# Patient Record
Sex: FEMALE | Race: WHITE | Employment: UNEMPLOYED | URBAN - METROPOLITAN AREA
[De-identification: names, ages, dates, MRNs, and addresses within clinical notes are randomized per-mention and may not be internally consistent; named-entity substitution may affect disease eponyms.]

---

## 2019-02-13 ENCOUNTER — HOSPITAL ENCOUNTER (OUTPATIENT)
Age: 79
Discharge: HOME | End: 2019-02-15
Payer: COMMERCIAL

## 2019-02-13 PROCEDURE — 87075 CULTR BACTERIA EXCEPT BLOOD: CPT

## 2019-02-13 PROCEDURE — 87205 SMEAR GRAM STAIN: CPT

## 2019-02-13 PROCEDURE — 87070 CULTURE OTHR SPECIMN AEROBIC: CPT

## 2019-02-15 LAB
ANAEROBIC CULTURE: NORMAL
GRAM STAIN RESULT: NORMAL
WOUND/ABSCESS: NORMAL

## 2019-12-07 ENCOUNTER — HOSPITAL ENCOUNTER (EMERGENCY)
Age: 79
Discharge: HOME | End: 2019-12-07
Attending: EMERGENCY MEDICINE
Payer: COMMERCIAL

## 2019-12-07 ENCOUNTER — APPOINTMENT (OUTPATIENT)
Dept: GENERAL RADIOLOGY | Age: 79
End: 2019-12-07
Payer: COMMERCIAL

## 2019-12-07 VITALS
WEIGHT: 150 LBS | RESPIRATION RATE: 16 BRPM | SYSTOLIC BLOOD PRESSURE: 162 MMHG | BODY MASS INDEX: 23.54 KG/M2 | HEIGHT: 67 IN | OXYGEN SATURATION: 98 % | HEART RATE: 88 BPM | TEMPERATURE: 98.1 F | DIASTOLIC BLOOD PRESSURE: 78 MMHG

## 2019-12-07 DIAGNOSIS — R05.9 COUGH: Primary | ICD-10-CM

## 2019-12-07 LAB
ALBUMIN SERPL-MCNC: 4.1 G/DL (ref 3.5–5.2)
ALP BLD-CCNC: 101 U/L (ref 35–104)
ALT SERPL-CCNC: 12 U/L (ref 0–32)
ANION GAP SERPL CALCULATED.3IONS-SCNC: 9 MMOL/L (ref 7–16)
AST SERPL-CCNC: 21 U/L (ref 0–31)
BACTERIA: NORMAL /HPF
BASOPHILS ABSOLUTE: 0.05 E9/L (ref 0–0.2)
BASOPHILS RELATIVE PERCENT: 0.5 % (ref 0–2)
BILIRUB SERPL-MCNC: 0.2 MG/DL (ref 0–1.2)
BILIRUBIN URINE: NEGATIVE
BLOOD, URINE: NORMAL
BUN BLDV-MCNC: 12 MG/DL (ref 8–23)
CALCIUM SERPL-MCNC: 9.2 MG/DL (ref 8.6–10.2)
CHLORIDE BLD-SCNC: 100 MMOL/L (ref 98–107)
CLARITY: CLEAR
CO2: 26 MMOL/L (ref 22–29)
COLOR: YELLOW
CREAT SERPL-MCNC: 0.6 MG/DL (ref 0.5–1)
EKG ATRIAL RATE: 65 BPM
EKG P AXIS: 18 DEGREES
EKG P-R INTERVAL: 140 MS
EKG Q-T INTERVAL: 424 MS
EKG QRS DURATION: 98 MS
EKG QTC CALCULATION (BAZETT): 440 MS
EKG R AXIS: 28 DEGREES
EKG T AXIS: 45 DEGREES
EKG VENTRICULAR RATE: 65 BPM
EOSINOPHILS ABSOLUTE: 0.08 E9/L (ref 0.05–0.5)
EOSINOPHILS RELATIVE PERCENT: 0.9 % (ref 0–6)
EPITHELIAL CELLS, UA: NORMAL /HPF
GFR AFRICAN AMERICAN: >60
GFR NON-AFRICAN AMERICAN: >60 ML/MIN/1.73
GLUCOSE BLD-MCNC: 104 MG/DL (ref 74–99)
GLUCOSE URINE: NEGATIVE MG/DL
HCT VFR BLD CALC: 40.7 % (ref 34–48)
HEMOGLOBIN: 12.7 G/DL (ref 11.5–15.5)
IMMATURE GRANULOCYTES #: 0.02 E9/L
IMMATURE GRANULOCYTES %: 0.2 % (ref 0–5)
KETONES, URINE: NEGATIVE MG/DL
LEUKOCYTE ESTERASE, URINE: NEGATIVE
LYMPHOCYTES ABSOLUTE: 1.79 E9/L (ref 1.5–4)
LYMPHOCYTES RELATIVE PERCENT: 19.2 % (ref 20–42)
MCH RBC QN AUTO: 28.5 PG (ref 26–35)
MCHC RBC AUTO-ENTMCNC: 31.2 % (ref 32–34.5)
MCV RBC AUTO: 91.5 FL (ref 80–99.9)
MONOCYTES ABSOLUTE: 0.84 E9/L (ref 0.1–0.95)
MONOCYTES RELATIVE PERCENT: 9 % (ref 2–12)
NEUTROPHILS ABSOLUTE: 6.52 E9/L (ref 1.8–7.3)
NEUTROPHILS RELATIVE PERCENT: 70.2 % (ref 43–80)
NITRITE, URINE: NEGATIVE
PDW BLD-RTO: 12.7 FL (ref 11.5–15)
PH UA: 6 (ref 5–9)
PLATELET # BLD: 340 E9/L (ref 130–450)
PMV BLD AUTO: 9.5 FL (ref 7–12)
POTASSIUM REFLEX MAGNESIUM: 4.3 MMOL/L (ref 3.5–5)
PROTEIN UA: NEGATIVE MG/DL
RBC # BLD: 4.45 E12/L (ref 3.5–5.5)
RBC UA: NORMAL /HPF (ref 0–2)
SODIUM BLD-SCNC: 135 MMOL/L (ref 132–146)
SPECIFIC GRAVITY UA: <=1.005 (ref 1–1.03)
TOTAL PROTEIN: 7.1 G/DL (ref 6.4–8.3)
TROPONIN: <0.01 NG/ML (ref 0–0.03)
UROBILINOGEN, URINE: 0.2 E.U./DL
WBC # BLD: 9.3 E9/L (ref 4.5–11.5)
WBC UA: NORMAL /HPF (ref 0–5)

## 2019-12-07 PROCEDURE — 81001 URINALYSIS AUTO W/SCOPE: CPT

## 2019-12-07 PROCEDURE — 2580000003 HC RX 258: Performed by: PHYSICIAN ASSISTANT

## 2019-12-07 PROCEDURE — 71046 X-RAY EXAM CHEST 2 VIEWS: CPT

## 2019-12-07 PROCEDURE — 99285 EMERGENCY DEPT VISIT HI MDM: CPT

## 2019-12-07 PROCEDURE — 80053 COMPREHEN METABOLIC PANEL: CPT

## 2019-12-07 PROCEDURE — 36415 COLL VENOUS BLD VENIPUNCTURE: CPT

## 2019-12-07 PROCEDURE — 84484 ASSAY OF TROPONIN QUANT: CPT

## 2019-12-07 PROCEDURE — 85025 COMPLETE CBC W/AUTO DIFF WBC: CPT

## 2019-12-07 PROCEDURE — 93010 ELECTROCARDIOGRAM REPORT: CPT | Performed by: INTERNAL MEDICINE

## 2019-12-07 PROCEDURE — 93005 ELECTROCARDIOGRAM TRACING: CPT | Performed by: PHYSICIAN ASSISTANT

## 2019-12-07 RX ORDER — 0.9 % SODIUM CHLORIDE 0.9 %
1000 INTRAVENOUS SOLUTION INTRAVENOUS ONCE
Status: COMPLETED | OUTPATIENT
Start: 2019-12-07 | End: 2019-12-07

## 2019-12-07 RX ORDER — BENZONATATE 100 MG/1
100 CAPSULE ORAL 3 TIMES DAILY PRN
Qty: 21 CAPSULE | Refills: 0 | Status: SHIPPED | OUTPATIENT
Start: 2019-12-07 | End: 2019-12-14

## 2019-12-07 RX ADMIN — SODIUM CHLORIDE 1000 ML: 9 INJECTION, SOLUTION INTRAVENOUS at 12:31

## 2019-12-07 ASSESSMENT — ENCOUNTER SYMPTOMS
DIARRHEA: 0
VOMITING: 0
NAUSEA: 0
TROUBLE SWALLOWING: 0
ABDOMINAL PAIN: 0
EYE PAIN: 0
PHOTOPHOBIA: 0
SINUS PAIN: 0
COLOR CHANGE: 0
CHEST TIGHTNESS: 0
SHORTNESS OF BREATH: 0
SORE THROAT: 0
CONSTIPATION: 0
FACIAL SWELLING: 0
BACK PAIN: 0
SINUS PRESSURE: 0
COUGH: 1
EYE REDNESS: 0
WHEEZING: 0

## 2020-09-05 ENCOUNTER — HOSPITAL ENCOUNTER (INPATIENT)
Age: 80
LOS: 5 days | Discharge: HOME | DRG: 301 | End: 2020-09-10
Attending: EMERGENCY MEDICINE | Admitting: GENERAL PRACTICE
Payer: COMMERCIAL

## 2020-09-05 ENCOUNTER — APPOINTMENT (OUTPATIENT)
Dept: GENERAL RADIOLOGY | Age: 80
DRG: 301 | End: 2020-09-05
Payer: COMMERCIAL

## 2020-09-05 ENCOUNTER — APPOINTMENT (OUTPATIENT)
Dept: CT IMAGING | Age: 80
DRG: 301 | End: 2020-09-05
Payer: COMMERCIAL

## 2020-09-05 PROBLEM — S72.001A CLOSED DISPLACED FRACTURE OF RIGHT FEMORAL NECK (HCC): Status: ACTIVE | Noted: 2020-09-05

## 2020-09-05 LAB
ANION GAP SERPL CALCULATED.3IONS-SCNC: 8 MMOL/L (ref 7–16)
BASOPHILS ABSOLUTE: 0.05 E9/L (ref 0–0.2)
BASOPHILS RELATIVE PERCENT: 0.5 % (ref 0–2)
BUN BLDV-MCNC: 18 MG/DL (ref 8–23)
CALCIUM SERPL-MCNC: 9.4 MG/DL (ref 8.6–10.2)
CHLORIDE BLD-SCNC: 105 MMOL/L (ref 98–107)
CO2: 28 MMOL/L (ref 22–29)
CREAT SERPL-MCNC: 0.8 MG/DL (ref 0.5–1)
EOSINOPHILS ABSOLUTE: 0.15 E9/L (ref 0.05–0.5)
EOSINOPHILS RELATIVE PERCENT: 1.4 % (ref 0–6)
GFR AFRICAN AMERICAN: >60
GFR NON-AFRICAN AMERICAN: >60 ML/MIN/1.73
GLUCOSE BLD-MCNC: 123 MG/DL (ref 74–99)
HCT VFR BLD CALC: 38.9 % (ref 34–48)
HEMOGLOBIN: 12.2 G/DL (ref 11.5–15.5)
IMMATURE GRANULOCYTES #: 0.07 E9/L
IMMATURE GRANULOCYTES %: 0.7 % (ref 0–5)
LYMPHOCYTES ABSOLUTE: 3.01 E9/L (ref 1.5–4)
LYMPHOCYTES RELATIVE PERCENT: 28.1 % (ref 20–42)
MCH RBC QN AUTO: 29 PG (ref 26–35)
MCHC RBC AUTO-ENTMCNC: 31.4 % (ref 32–34.5)
MCV RBC AUTO: 92.4 FL (ref 80–99.9)
MONOCYTES ABSOLUTE: 0.79 E9/L (ref 0.1–0.95)
MONOCYTES RELATIVE PERCENT: 7.4 % (ref 2–12)
NEUTROPHILS ABSOLUTE: 6.63 E9/L (ref 1.8–7.3)
NEUTROPHILS RELATIVE PERCENT: 61.9 % (ref 43–80)
PDW BLD-RTO: 12.9 FL (ref 11.5–15)
PLATELET # BLD: 288 E9/L (ref 130–450)
PMV BLD AUTO: 9.5 FL (ref 7–12)
POTASSIUM REFLEX MAGNESIUM: 4.4 MMOL/L (ref 3.5–5)
RBC # BLD: 4.21 E12/L (ref 3.5–5.5)
SODIUM BLD-SCNC: 141 MMOL/L (ref 132–146)
TROPONIN: <0.01 NG/ML (ref 0–0.03)
WBC # BLD: 10.7 E9/L (ref 4.5–11.5)

## 2020-09-05 PROCEDURE — 99285 EMERGENCY DEPT VISIT HI MDM: CPT

## 2020-09-05 PROCEDURE — 84484 ASSAY OF TROPONIN QUANT: CPT

## 2020-09-05 PROCEDURE — 1200000000 HC SEMI PRIVATE

## 2020-09-05 PROCEDURE — 73610 X-RAY EXAM OF ANKLE: CPT

## 2020-09-05 PROCEDURE — 70450 CT HEAD/BRAIN W/O DYE: CPT

## 2020-09-05 PROCEDURE — 73552 X-RAY EXAM OF FEMUR 2/>: CPT

## 2020-09-05 PROCEDURE — 73590 X-RAY EXAM OF LOWER LEG: CPT

## 2020-09-05 PROCEDURE — 72125 CT NECK SPINE W/O DYE: CPT

## 2020-09-05 PROCEDURE — 93005 ELECTROCARDIOGRAM TRACING: CPT | Performed by: STUDENT IN AN ORGANIZED HEALTH CARE EDUCATION/TRAINING PROGRAM

## 2020-09-05 PROCEDURE — 80048 BASIC METABOLIC PNL TOTAL CA: CPT

## 2020-09-05 PROCEDURE — 85025 COMPLETE CBC W/AUTO DIFF WBC: CPT

## 2020-09-05 PROCEDURE — 71045 X-RAY EXAM CHEST 1 VIEW: CPT

## 2020-09-05 PROCEDURE — 73502 X-RAY EXAM HIP UNI 2-3 VIEWS: CPT

## 2020-09-05 ASSESSMENT — PAIN DESCRIPTION - ORIENTATION: ORIENTATION: RIGHT;LOWER;UPPER

## 2020-09-05 ASSESSMENT — PAIN DESCRIPTION - PAIN TYPE: TYPE: ACUTE PAIN

## 2020-09-05 ASSESSMENT — PAIN DESCRIPTION - DESCRIPTORS: DESCRIPTORS: ACHING;NUMBNESS

## 2020-09-05 ASSESSMENT — PAIN SCALES - GENERAL: PAINLEVEL_OUTOF10: 6

## 2020-09-05 ASSESSMENT — PAIN DESCRIPTION - FREQUENCY: FREQUENCY: CONTINUOUS

## 2020-09-05 ASSESSMENT — PAIN DESCRIPTION - LOCATION: LOCATION: LEG

## 2020-09-06 ENCOUNTER — ANESTHESIA (OUTPATIENT)
Dept: OPERATING ROOM | Age: 80
DRG: 301 | End: 2020-09-06
Payer: COMMERCIAL

## 2020-09-06 ENCOUNTER — APPOINTMENT (OUTPATIENT)
Dept: GENERAL RADIOLOGY | Age: 80
DRG: 301 | End: 2020-09-06
Payer: COMMERCIAL

## 2020-09-06 ENCOUNTER — ANESTHESIA EVENT (OUTPATIENT)
Dept: OPERATING ROOM | Age: 80
DRG: 301 | End: 2020-09-06
Payer: COMMERCIAL

## 2020-09-06 VITALS
SYSTOLIC BLOOD PRESSURE: 153 MMHG | RESPIRATION RATE: 17 BRPM | TEMPERATURE: 97.7 F | OXYGEN SATURATION: 96 % | DIASTOLIC BLOOD PRESSURE: 90 MMHG

## 2020-09-06 LAB
EKG ATRIAL RATE: 71 BPM
EKG P AXIS: 52 DEGREES
EKG P-R INTERVAL: 176 MS
EKG Q-T INTERVAL: 398 MS
EKG QRS DURATION: 90 MS
EKG QTC CALCULATION (BAZETT): 432 MS
EKG R AXIS: 35 DEGREES
EKG T AXIS: 50 DEGREES
EKG VENTRICULAR RATE: 71 BPM

## 2020-09-06 PROCEDURE — 2580000003 HC RX 258: Performed by: NURSE ANESTHETIST, CERTIFIED REGISTERED

## 2020-09-06 PROCEDURE — 6370000000 HC RX 637 (ALT 250 FOR IP): Performed by: ORTHOPAEDIC SURGERY

## 2020-09-06 PROCEDURE — 2709999900 HC NON-CHARGEABLE SUPPLY: Performed by: ORTHOPAEDIC SURGERY

## 2020-09-06 PROCEDURE — 6360000002 HC RX W HCPCS: Performed by: ORTHOPAEDIC SURGERY

## 2020-09-06 PROCEDURE — C1776 JOINT DEVICE (IMPLANTABLE): HCPCS | Performed by: ORTHOPAEDIC SURGERY

## 2020-09-06 PROCEDURE — 1200000000 HC SEMI PRIVATE

## 2020-09-06 PROCEDURE — 6360000002 HC RX W HCPCS: Performed by: ANESTHESIOLOGY

## 2020-09-06 PROCEDURE — 2500000003 HC RX 250 WO HCPCS: Performed by: NURSE ANESTHETIST, CERTIFIED REGISTERED

## 2020-09-06 PROCEDURE — 2700000000 HC OXYGEN THERAPY PER DAY

## 2020-09-06 PROCEDURE — 0SRR0JZ REPLACEMENT OF RIGHT HIP JOINT, FEMORAL SURFACE WITH SYNTHETIC SUBSTITUTE, OPEN APPROACH: ICD-10-PCS | Performed by: ORTHOPAEDIC SURGERY

## 2020-09-06 PROCEDURE — 7100000001 HC PACU RECOVERY - ADDTL 15 MIN: Performed by: ORTHOPAEDIC SURGERY

## 2020-09-06 PROCEDURE — 6370000000 HC RX 637 (ALT 250 FOR IP): Performed by: GENERAL PRACTICE

## 2020-09-06 PROCEDURE — 88311 DECALCIFY TISSUE: CPT

## 2020-09-06 PROCEDURE — 87081 CULTURE SCREEN ONLY: CPT

## 2020-09-06 PROCEDURE — 7100000000 HC PACU RECOVERY - FIRST 15 MIN: Performed by: ORTHOPAEDIC SURGERY

## 2020-09-06 PROCEDURE — 73501 X-RAY EXAM HIP UNI 1 VIEW: CPT

## 2020-09-06 PROCEDURE — 88305 TISSUE EXAM BY PATHOLOGIST: CPT

## 2020-09-06 PROCEDURE — 3700000001 HC ADD 15 MINUTES (ANESTHESIA): Performed by: ORTHOPAEDIC SURGERY

## 2020-09-06 PROCEDURE — 99254 IP/OBS CNSLTJ NEW/EST MOD 60: CPT | Performed by: ORTHOPAEDIC SURGERY

## 2020-09-06 PROCEDURE — 3600000014 HC SURGERY LEVEL 4 ADDTL 15MIN: Performed by: ORTHOPAEDIC SURGERY

## 2020-09-06 PROCEDURE — 93010 ELECTROCARDIOGRAM REPORT: CPT | Performed by: INTERNAL MEDICINE

## 2020-09-06 PROCEDURE — 6360000002 HC RX W HCPCS: Performed by: NURSE ANESTHETIST, CERTIFIED REGISTERED

## 2020-09-06 PROCEDURE — 3600000004 HC SURGERY LEVEL 4 BASE: Performed by: ORTHOPAEDIC SURGERY

## 2020-09-06 PROCEDURE — 3700000000 HC ANESTHESIA ATTENDED CARE: Performed by: ORTHOPAEDIC SURGERY

## 2020-09-06 PROCEDURE — 6370000000 HC RX 637 (ALT 250 FOR IP): Performed by: EMERGENCY MEDICINE

## 2020-09-06 PROCEDURE — 2580000003 HC RX 258: Performed by: ORTHOPAEDIC SURGERY

## 2020-09-06 PROCEDURE — 27236 TREAT THIGH FRACTURE: CPT | Performed by: ORTHOPAEDIC SURGERY

## 2020-09-06 PROCEDURE — U0003 INFECTIOUS AGENT DETECTION BY NUCLEIC ACID (DNA OR RNA); SEVERE ACUTE RESPIRATORY SYNDROME CORONAVIRUS 2 (SARS-COV-2) (CORONAVIRUS DISEASE [COVID-19]), AMPLIFIED PROBE TECHNIQUE, MAKING USE OF HIGH THROUGHPUT TECHNOLOGIES AS DESCRIBED BY CMS-2020-01-R: HCPCS

## 2020-09-06 DEVICE — HEAD FEM DIA26MM -3MM OFFSET HIP CO CHROM POLYETH TAPR LO: Type: IMPLANTABLE DEVICE | Site: HIP | Status: FUNCTIONAL

## 2020-09-06 DEVICE — STEM FEM SZ 4 L125MM NK L35MM +44MM OFFSET 127DEG HIP CO: Type: IMPLANTABLE DEVICE | Site: HIP | Status: FUNCTIONAL

## 2020-09-06 DEVICE — HEAD FEM OD45MM ID26MM HIP CO CHROM POLYETH BPLR CEMENTLESS: Type: IMPLANTABLE DEVICE | Site: HIP | Status: FUNCTIONAL

## 2020-09-06 RX ORDER — NEOSTIGMINE METHYLSULFATE 1 MG/ML
INJECTION, SOLUTION INTRAVENOUS PRN
Status: DISCONTINUED | OUTPATIENT
Start: 2020-09-06 | End: 2020-09-06 | Stop reason: SDUPTHER

## 2020-09-06 RX ORDER — SODIUM CHLORIDE 0.9 % (FLUSH) 0.9 %
10 SYRINGE (ML) INJECTION PRN
Status: DISCONTINUED | OUTPATIENT
Start: 2020-09-06 | End: 2020-09-10 | Stop reason: HOSPADM

## 2020-09-06 RX ORDER — MORPHINE SULFATE 4 MG/ML
4 INJECTION, SOLUTION INTRAMUSCULAR; INTRAVENOUS
Status: DISCONTINUED | OUTPATIENT
Start: 2020-09-06 | End: 2020-09-10 | Stop reason: HOSPADM

## 2020-09-06 RX ORDER — FENTANYL CITRATE 50 UG/ML
INJECTION, SOLUTION INTRAMUSCULAR; INTRAVENOUS PRN
Status: DISCONTINUED | OUTPATIENT
Start: 2020-09-06 | End: 2020-09-06 | Stop reason: SDUPTHER

## 2020-09-06 RX ORDER — PROPOFOL 10 MG/ML
INJECTION, EMULSION INTRAVENOUS PRN
Status: DISCONTINUED | OUTPATIENT
Start: 2020-09-06 | End: 2020-09-06 | Stop reason: SDUPTHER

## 2020-09-06 RX ORDER — GLYCOPYRROLATE 0.2 MG/ML
INJECTION INTRAMUSCULAR; INTRAVENOUS PRN
Status: DISCONTINUED | OUTPATIENT
Start: 2020-09-06 | End: 2020-09-06 | Stop reason: SDUPTHER

## 2020-09-06 RX ORDER — HYDROCODONE BITARTRATE AND ACETAMINOPHEN 5; 325 MG/1; MG/1
1 TABLET ORAL EVERY 6 HOURS PRN
Qty: 28 TABLET | Refills: 0 | Status: SHIPPED | OUTPATIENT
Start: 2020-09-06 | End: 2020-09-13

## 2020-09-06 RX ORDER — ONDANSETRON 2 MG/ML
INJECTION INTRAMUSCULAR; INTRAVENOUS PRN
Status: DISCONTINUED | OUTPATIENT
Start: 2020-09-06 | End: 2020-09-06 | Stop reason: SDUPTHER

## 2020-09-06 RX ORDER — ATORVASTATIN CALCIUM 20 MG/1
20 TABLET, FILM COATED ORAL DAILY
Status: DISCONTINUED | OUTPATIENT
Start: 2020-09-06 | End: 2020-09-10 | Stop reason: HOSPADM

## 2020-09-06 RX ORDER — HYDROCODONE BITARTRATE AND ACETAMINOPHEN 5; 325 MG/1; MG/1
1 TABLET ORAL ONCE
Status: COMPLETED | OUTPATIENT
Start: 2020-09-06 | End: 2020-09-06

## 2020-09-06 RX ORDER — PROMETHAZINE HYDROCHLORIDE 25 MG/1
12.5 TABLET ORAL EVERY 6 HOURS PRN
Status: DISCONTINUED | OUTPATIENT
Start: 2020-09-06 | End: 2020-09-10 | Stop reason: HOSPADM

## 2020-09-06 RX ORDER — OXYCODONE HYDROCHLORIDE AND ACETAMINOPHEN 5; 325 MG/1; MG/1
1 TABLET ORAL
Status: DISCONTINUED | OUTPATIENT
Start: 2020-09-06 | End: 2020-09-06 | Stop reason: HOSPADM

## 2020-09-06 RX ORDER — SODIUM CHLORIDE 9 MG/ML
INJECTION, SOLUTION INTRAVENOUS CONTINUOUS
Status: ACTIVE | OUTPATIENT
Start: 2020-09-06 | End: 2020-09-07

## 2020-09-06 RX ORDER — SENNA AND DOCUSATE SODIUM 50; 8.6 MG/1; MG/1
1 TABLET, FILM COATED ORAL 2 TIMES DAILY
Status: DISCONTINUED | OUTPATIENT
Start: 2020-09-06 | End: 2020-09-10 | Stop reason: HOSPADM

## 2020-09-06 RX ORDER — SODIUM CHLORIDE 0.9 % (FLUSH) 0.9 %
10 SYRINGE (ML) INJECTION EVERY 12 HOURS SCHEDULED
Status: DISCONTINUED | OUTPATIENT
Start: 2020-09-06 | End: 2020-09-10 | Stop reason: HOSPADM

## 2020-09-06 RX ORDER — PROMETHAZINE HYDROCHLORIDE 25 MG/ML
6.25 INJECTION, SOLUTION INTRAMUSCULAR; INTRAVENOUS PRN
Status: DISCONTINUED | OUTPATIENT
Start: 2020-09-06 | End: 2020-09-06 | Stop reason: HOSPADM

## 2020-09-06 RX ORDER — ASPIRIN 81 MG/1
81 TABLET ORAL 2 TIMES DAILY
Qty: 56 TABLET | Refills: 0 | Status: SHIPPED | OUTPATIENT
Start: 2020-09-06 | End: 2020-10-04

## 2020-09-06 RX ORDER — ONDANSETRON 2 MG/ML
4 INJECTION INTRAMUSCULAR; INTRAVENOUS EVERY 6 HOURS PRN
Status: DISCONTINUED | OUTPATIENT
Start: 2020-09-06 | End: 2020-09-10 | Stop reason: HOSPADM

## 2020-09-06 RX ORDER — MEPERIDINE HYDROCHLORIDE 25 MG/ML
12.5 INJECTION INTRAMUSCULAR; INTRAVENOUS; SUBCUTANEOUS
Status: DISCONTINUED | OUTPATIENT
Start: 2020-09-06 | End: 2020-09-06 | Stop reason: HOSPADM

## 2020-09-06 RX ORDER — ACETAMINOPHEN 325 MG/1
650 TABLET ORAL EVERY 4 HOURS PRN
Status: DISCONTINUED | OUTPATIENT
Start: 2020-09-06 | End: 2020-09-10 | Stop reason: HOSPADM

## 2020-09-06 RX ORDER — ROCURONIUM BROMIDE 10 MG/ML
INJECTION, SOLUTION INTRAVENOUS PRN
Status: DISCONTINUED | OUTPATIENT
Start: 2020-09-06 | End: 2020-09-06 | Stop reason: SDUPTHER

## 2020-09-06 RX ORDER — OXYCODONE HYDROCHLORIDE 5 MG/1
10 TABLET ORAL EVERY 4 HOURS PRN
Status: DISCONTINUED | OUTPATIENT
Start: 2020-09-06 | End: 2020-09-10 | Stop reason: HOSPADM

## 2020-09-06 RX ORDER — GLYCOPYRROLATE 1 MG/5 ML
SYRINGE (ML) INTRAVENOUS PRN
Status: DISCONTINUED | OUTPATIENT
Start: 2020-09-06 | End: 2020-09-06 | Stop reason: SDUPTHER

## 2020-09-06 RX ORDER — HYDROCODONE BITARTRATE AND ACETAMINOPHEN 5; 325 MG/1; MG/1
1 TABLET ORAL EVERY 4 HOURS PRN
Status: DISCONTINUED | OUTPATIENT
Start: 2020-09-06 | End: 2020-09-10 | Stop reason: HOSPADM

## 2020-09-06 RX ORDER — FENTANYL CITRATE 50 UG/ML
50 INJECTION, SOLUTION INTRAMUSCULAR; INTRAVENOUS EVERY 5 MIN PRN
Status: DISCONTINUED | OUTPATIENT
Start: 2020-09-06 | End: 2020-09-06 | Stop reason: HOSPADM

## 2020-09-06 RX ORDER — LIDOCAINE HYDROCHLORIDE 20 MG/ML
INJECTION, SOLUTION EPIDURAL; INFILTRATION; INTRACAUDAL; PERINEURAL PRN
Status: DISCONTINUED | OUTPATIENT
Start: 2020-09-06 | End: 2020-09-06 | Stop reason: SDUPTHER

## 2020-09-06 RX ORDER — SODIUM CHLORIDE 9 MG/ML
INJECTION, SOLUTION INTRAVENOUS CONTINUOUS PRN
Status: DISCONTINUED | OUTPATIENT
Start: 2020-09-06 | End: 2020-09-06 | Stop reason: SDUPTHER

## 2020-09-06 RX ORDER — OXYCODONE HYDROCHLORIDE 5 MG/1
5 TABLET ORAL EVERY 4 HOURS PRN
Status: DISCONTINUED | OUTPATIENT
Start: 2020-09-06 | End: 2020-09-10 | Stop reason: HOSPADM

## 2020-09-06 RX ORDER — ASPIRIN 81 MG/1
81 TABLET ORAL 2 TIMES DAILY
Status: DISCONTINUED | OUTPATIENT
Start: 2020-09-06 | End: 2020-09-10 | Stop reason: HOSPADM

## 2020-09-06 RX ORDER — MORPHINE SULFATE 2 MG/ML
2 INJECTION, SOLUTION INTRAMUSCULAR; INTRAVENOUS
Status: DISCONTINUED | OUTPATIENT
Start: 2020-09-06 | End: 2020-09-10 | Stop reason: HOSPADM

## 2020-09-06 RX ADMIN — HYDROMORPHONE HYDROCHLORIDE 0.25 MG: 1 INJECTION, SOLUTION INTRAMUSCULAR; INTRAVENOUS; SUBCUTANEOUS at 19:59

## 2020-09-06 RX ADMIN — DOCUSATE SODIUM 50 MG AND SENNOSIDES 8.6 MG 1 TABLET: 8.6; 5 TABLET, FILM COATED ORAL at 21:34

## 2020-09-06 RX ADMIN — GLYCOPYRROLATE 0.6 MG: 0.2 INJECTION INTRAMUSCULAR; INTRAVENOUS at 19:26

## 2020-09-06 RX ADMIN — SODIUM CHLORIDE: 9 INJECTION, SOLUTION INTRAVENOUS at 19:17

## 2020-09-06 RX ADMIN — HYDROCODONE BITARTRATE AND ACETAMINOPHEN 1 TABLET: 5; 325 TABLET ORAL at 16:37

## 2020-09-06 RX ADMIN — SODIUM CHLORIDE, PRESERVATIVE FREE 10 ML: 5 INJECTION INTRAVENOUS at 21:31

## 2020-09-06 RX ADMIN — MORPHINE SULFATE 2 MG: 2 INJECTION, SOLUTION INTRAMUSCULAR; INTRAVENOUS at 21:34

## 2020-09-06 RX ADMIN — PROPOFOL 70 MG: 10 INJECTION, EMULSION INTRAVENOUS at 18:05

## 2020-09-06 RX ADMIN — ONDANSETRON 4 MG: 2 INJECTION INTRAMUSCULAR; INTRAVENOUS at 18:24

## 2020-09-06 RX ADMIN — FENTANYL CITRATE 50 MCG: 50 INJECTION, SOLUTION INTRAMUSCULAR; INTRAVENOUS at 18:22

## 2020-09-06 RX ADMIN — ROCURONIUM BROMIDE 40 MG: 10 INJECTION, SOLUTION INTRAVENOUS at 18:05

## 2020-09-06 RX ADMIN — HYDROCODONE BITARTRATE AND ACETAMINOPHEN 1 TABLET: 5; 325 TABLET ORAL at 00:27

## 2020-09-06 RX ADMIN — FENTANYL CITRATE 50 MCG: 50 INJECTION, SOLUTION INTRAMUSCULAR; INTRAVENOUS at 18:05

## 2020-09-06 RX ADMIN — LIDOCAINE HYDROCHLORIDE 40 MG: 20 INJECTION, SOLUTION EPIDURAL; INFILTRATION; INTRACAUDAL; PERINEURAL at 18:05

## 2020-09-06 RX ADMIN — Medication 2 G: at 18:01

## 2020-09-06 RX ADMIN — ROCURONIUM BROMIDE 10 MG: 10 INJECTION, SOLUTION INTRAVENOUS at 18:44

## 2020-09-06 RX ADMIN — Medication 0.6 MG: at 19:27

## 2020-09-06 RX ADMIN — SODIUM CHLORIDE: 9 INJECTION, SOLUTION INTRAVENOUS at 18:02

## 2020-09-06 RX ADMIN — HYDROMORPHONE HYDROCHLORIDE 0.25 MG: 1 INJECTION, SOLUTION INTRAMUSCULAR; INTRAVENOUS; SUBCUTANEOUS at 20:03

## 2020-09-06 RX ADMIN — ASPIRIN 81 MG: 81 TABLET, COATED ORAL at 21:34

## 2020-09-06 RX ADMIN — Medication 3 MG: at 19:26

## 2020-09-06 ASSESSMENT — PULMONARY FUNCTION TESTS
PIF_VALUE: 18
PIF_VALUE: 25
PIF_VALUE: 18
PIF_VALUE: 24
PIF_VALUE: 22
PIF_VALUE: 17
PIF_VALUE: 18
PIF_VALUE: 17
PIF_VALUE: 15
PIF_VALUE: 17
PIF_VALUE: 18
PIF_VALUE: 17
PIF_VALUE: 19
PIF_VALUE: 16
PIF_VALUE: 18
PIF_VALUE: 17
PIF_VALUE: 0
PIF_VALUE: 18
PIF_VALUE: 17
PIF_VALUE: 18
PIF_VALUE: 18
PIF_VALUE: 17
PIF_VALUE: 19
PIF_VALUE: 17
PIF_VALUE: 17
PIF_VALUE: 19
PIF_VALUE: 19
PIF_VALUE: 17
PIF_VALUE: 18
PIF_VALUE: 16
PIF_VALUE: 17
PIF_VALUE: 8
PIF_VALUE: 18
PIF_VALUE: 18
PIF_VALUE: 19
PIF_VALUE: 18
PIF_VALUE: 18
PIF_VALUE: 2
PIF_VALUE: 18
PIF_VALUE: 5
PIF_VALUE: 18
PIF_VALUE: 17
PIF_VALUE: 17
PIF_VALUE: 18
PIF_VALUE: 19
PIF_VALUE: 3
PIF_VALUE: 17
PIF_VALUE: 15
PIF_VALUE: 17
PIF_VALUE: 18
PIF_VALUE: 18
PIF_VALUE: 16
PIF_VALUE: 20
PIF_VALUE: 17
PIF_VALUE: 18
PIF_VALUE: 7
PIF_VALUE: 18
PIF_VALUE: 14
PIF_VALUE: 14
PIF_VALUE: 18
PIF_VALUE: 18
PIF_VALUE: 0
PIF_VALUE: 14
PIF_VALUE: 17
PIF_VALUE: 17
PIF_VALUE: 18
PIF_VALUE: 17
PIF_VALUE: 18
PIF_VALUE: 5
PIF_VALUE: 5
PIF_VALUE: 18
PIF_VALUE: 18
PIF_VALUE: 10
PIF_VALUE: 17
PIF_VALUE: 19
PIF_VALUE: 21
PIF_VALUE: 17
PIF_VALUE: 18
PIF_VALUE: 3
PIF_VALUE: 18
PIF_VALUE: 17
PIF_VALUE: 17
PIF_VALUE: 0
PIF_VALUE: 16
PIF_VALUE: 17
PIF_VALUE: 1
PIF_VALUE: 15
PIF_VALUE: 18
PIF_VALUE: 17
PIF_VALUE: 14
PIF_VALUE: 17
PIF_VALUE: 20
PIF_VALUE: 17
PIF_VALUE: 19
PIF_VALUE: 4
PIF_VALUE: 17
PIF_VALUE: 18
PIF_VALUE: 19

## 2020-09-06 ASSESSMENT — PAIN DESCRIPTION - PAIN TYPE
TYPE: SURGICAL PAIN
TYPE: SURGICAL PAIN
TYPE: ACUTE PAIN
TYPE: SURGICAL PAIN
TYPE: ACUTE PAIN

## 2020-09-06 ASSESSMENT — ENCOUNTER SYMPTOMS
DIARRHEA: 0
SINUS PRESSURE: 0
ABDOMINAL DISTENTION: 0
SORE THROAT: 0
BACK PAIN: 0
NAUSEA: 0
EYE DISCHARGE: 0
WHEEZING: 0
EYE REDNESS: 0
EYE PAIN: 0
COUGH: 0
SHORTNESS OF BREATH: 0
VOMITING: 0

## 2020-09-06 ASSESSMENT — PAIN - FUNCTIONAL ASSESSMENT
PAIN_FUNCTIONAL_ASSESSMENT: PREVENTS OR INTERFERES WITH MANY ACTIVE NOT PASSIVE ACTIVITIES
PAIN_FUNCTIONAL_ASSESSMENT: PREVENTS OR INTERFERES SOME ACTIVE ACTIVITIES AND ADLS
PAIN_FUNCTIONAL_ASSESSMENT: PREVENTS OR INTERFERES SOME ACTIVE ACTIVITIES AND ADLS

## 2020-09-06 ASSESSMENT — PAIN SCALES - GENERAL
PAINLEVEL_OUTOF10: 9
PAINLEVEL_OUTOF10: 7
PAINLEVEL_OUTOF10: 6
PAINLEVEL_OUTOF10: 7
PAINLEVEL_OUTOF10: 6
PAINLEVEL_OUTOF10: 9
PAINLEVEL_OUTOF10: 4
PAINLEVEL_OUTOF10: 0
PAINLEVEL_OUTOF10: 6
PAINLEVEL_OUTOF10: 9
PAINLEVEL_OUTOF10: 5
PAINLEVEL_OUTOF10: 5

## 2020-09-06 ASSESSMENT — PAIN DESCRIPTION - FREQUENCY
FREQUENCY: CONTINUOUS

## 2020-09-06 ASSESSMENT — PAIN DESCRIPTION - ORIENTATION
ORIENTATION: RIGHT

## 2020-09-06 ASSESSMENT — PAIN DESCRIPTION - ONSET
ONSET: ON-GOING
ONSET: GRADUAL
ONSET: GRADUAL
ONSET: ON-GOING

## 2020-09-06 ASSESSMENT — PAIN DESCRIPTION - LOCATION
LOCATION: HIP
LOCATION: HIP
LOCATION: ANKLE;HIP
LOCATION: HIP
LOCATION: HIP;ANKLE
LOCATION: HIP
LOCATION: HIP

## 2020-09-06 ASSESSMENT — PAIN DESCRIPTION - DESCRIPTORS
DESCRIPTORS: ACHING;DISCOMFORT;CONSTANT
DESCRIPTORS: DISCOMFORT
DESCRIPTORS: DISCOMFORT
DESCRIPTORS: ACHING;DISCOMFORT;CONSTANT;SORE
DESCRIPTORS: DISCOMFORT

## 2020-09-06 ASSESSMENT — PAIN DESCRIPTION - PROGRESSION
CLINICAL_PROGRESSION: NOT CHANGED
CLINICAL_PROGRESSION: GRADUALLY WORSENING
CLINICAL_PROGRESSION: GRADUALLY IMPROVING
CLINICAL_PROGRESSION: NOT CHANGED
CLINICAL_PROGRESSION: GRADUALLY IMPROVING

## 2020-09-06 ASSESSMENT — LIFESTYLE VARIABLES: SMOKING_STATUS: 0

## 2020-09-06 NOTE — CONSULTS
Chief Complaint   Patient presents with    Numbness     rt leg    Fall    Leg Pain     rt       SUBJECTIVE: Patient is a very pleasant 81-year-old female who came into the emergency department yesterday with a chief complaint of right hip pain. She states she had some numbness in her right leg then all of a sudden fell and then was able to ambulate on the right side. She was found to have a significantly displaced right femoral neck fracture. She does not currently live alone but she states she is having difficulties with her independence recently. She denies any chronic right hip pain. She denies any other injuries. Review of Systems   Constitutional: Negative for fever, chills, diaphoresis, appetite change and fatigue. HENT: Negative for dental issues, hearing loss and tinnitus. Negative for congestion, sinus pressure, sneezing, sore throat. Negative for headache. Eyes: Negative for visual disturbance, blurred and double vision. Negative for pain, discharge, redness and itching  Respiratory: Negative for cough, shortness of breath and wheezing. Cardiovascular: Negative for chest pain, palpitations and leg swelling. No dyspnea on exertion   Gastrointestinal:   Negative for nausea, vomiting, abdominal pain, diarrhea, constipation  or black or bloody. Hematologic\Lymphatic:  negative for bleeding, petechiae,   Genitourinary: Negative for hematuria and difficulty urinating. Musculoskeletal: Negative for neck pain and stiffness. Negative for back pain, see HPI  Skin: Negative for pallor, rash and wound. Neurological: Negative for dizziness, tremors, seizures, weakness, light-headedness, no TIA or stroke symptoms. No numbness and headaches. Psychiatric/Behavioral: Negative.         Past Medical History:   Diagnosis Date    Hyperlipidemia     SCC (squamous cell carcinoma) 3/7/2011     Past Surgical History:   Procedure Laterality Date    BREAST SURGERY      pt unknown what breast lump removed femoral neck fracture, displaced         Discussion:  I explained the surgery in detail. I explained the risks and complications of surgery with the patient including but not limited to death from anesthesia, possible neurovascular damage, possible infection,  Possible wound healing issues, possible perioperative fracture, leg length discrepancy, implant failure, possible need for further surgery, etc.  Patient understood this, asked appropriate questions and decided to go forward with the procedure. PLAN:    Plan for right hip hemiarthroplasty for femoral neck fracture    Medical management appreciate their input    Will most likely need some rehab after fixation.         ELECTRONICALLY signed by:    Pravin Luz MD  9/6/20

## 2020-09-06 NOTE — ED NOTES
Bed: 07  Expected date:   Expected time:   Means of arrival:   Comments:  Hip pain     Jamin Wang RN  09/05/20 2288

## 2020-09-06 NOTE — ANESTHESIA PRE PROCEDURE
Department of Anesthesiology  Preprocedure Note       Name:  Ash Eaton   Age:  70 y.o.  :  1940                                          MRN:  [id number]         Date:  2020      Surgeon: Juliane Colon):  Elizabeth Randhawa MD    Procedure: Procedure(s):  HIP HEMIARTHROPLASTY    Medications prior to admission:   Prior to Admission medications    Medication Sig Start Date End Date Taking? Authorizing Provider   simvastatin (ZOCOR) 40 MG tablet Take 40 mg by mouth nightly   Yes Historical Provider, MD       Current medications:    Current Facility-Administered Medications   Medication Dose Route Frequency Provider Last Rate Last Dose    HYDROcodone-acetaminophen (NORCO) 5-325 MG per tablet 1 tablet  1 tablet Oral Q4H PRN Metro Clau, DO        atorvastatin (LIPITOR) tablet 20 mg  20 mg Oral Daily Metro Clau DO           Allergies:  No Known Allergies    Problem List:    Patient Active Problem List   Diagnosis Code    SCC (squamous cell carcinoma) C44.92    Melanoma ([facility] [address] C43.9    Closed displaced fracture of right femoral neck ([facility] [address] S72.001A       Past Medical History:        Diagnosis Date    Hyperlipidemia     SCC (squamous cell carcinoma) 3/7/2011       Past Surgical History:        Procedure Laterality Date    BREAST SURGERY      pt unknown what breast lump removed    HYSTERECTOMY      pt unsure of med hx    SKIN BIOPSY         Social History:    Social History     Tobacco Use    Smoking status: Never Smoker    Smokeless tobacco: Never Used   Substance Use Topics    Alcohol use:  No                                Counseling given: Not Answered      Vital Signs (Current):   Vitals:    20 2144 20 2326 20 0045 20 0845   BP: (!) 160/72 (!) 159/60 (!) 147/71 (!) 121/56   Pulse: 70 72 83 77   Resp: 16 18 18 16   Temp: 97.6 °F (36.4 °C) 97.5 °F (36.4 °C) 98.3 °F (36.8 °C) 98.8 °F (37.1 °C)   TempSrc: Oral Oral Oral Oral   SpO2: 95% 96% 98% 98% BP Readings from Last 3 Encounters:   09/06/20 (!) 121/56   12/07/19 (!) 162/78   03/04/16 120/56       NPO Status: Time of last liquid consumption: 2100                        Time of last solid consumption: 2100                        Date of last liquid consumption: 09/05/20                        Date of last solid food consumption: 09/05/20    BMI:   Wt Readings from Last 3 Encounters:   12/07/19 150 lb (68 kg)   03/04/16 166 lb (75.3 kg)   02/11/16 160 lb (72.6 kg)     There is no height or weight on file to calculate BMI.    CBC:   Lab Results   Component Value Date    WBC 10.7 09/05/2020    RBC 4.21 09/05/2020    HGB 12.2 09/05/2020    HCT 38.9 09/05/2020    MCV 92.4 09/05/2020    RDW 12.9 09/05/2020     09/05/2020       CMP:   Lab Results   Component Value Date     09/05/2020    K 4.4 09/05/2020     09/05/2020    CO2 28 09/05/2020    BUN 18 09/05/2020    CREATININE 0.8 09/05/2020    GFRAA >60 09/05/2020    LABGLOM >60 09/05/2020    GLUCOSE 123 09/05/2020    PROT 7.1 12/07/2019    CALCIUM 9.4 09/05/2020    BILITOT 0.2 12/07/2019    ALKPHOS 101 12/07/2019    AST 21 12/07/2019    ALT 12 12/07/2019       POC Tests: No results for input(s): POCGLU, POCNA, POCK, POCCL, POCBUN, POCHEMO, POCHCT in the last 72 hours.     Coags: No results found for: PROTIME, INR, APTT    HCG (If Applicable): No results found for: PREGTESTUR, PREGSERUM, HCG, HCGQUANT     ABGs: No results found for: PHART, PO2ART, HPK7IWD, GPU8IRP, BEART, D9KEZDIR     Type & Screen (If Applicable):  No results found for: LABABO, LABRH    Drug/Infectious Status (If Applicable):  No results found for: HIV, HEPCAB    COVID-19 Screening (If Applicable): No results found for: COVID19      Anesthesia Evaluation  Patient summary reviewed and Nursing notes reviewed no history of anesthetic complications:   Airway: Mallampati: II  TM distance: >3 FB   Neck ROM: full  Mouth opening: > = 3 FB Dental: normal exam Pulmonary:Negative Pulmonary ROS breath sounds clear to auscultation      (-) not a current smoker                           Cardiovascular:  Exercise tolerance: good (>4 METS),   (+) hyperlipidemia      ECG reviewed  Rhythm: regular  Rate: normal           Beta Blocker:  Not on Beta Blocker         Neuro/Psych:                ROS comment: Scoliosis   GI/Hepatic/Renal: Neg GI/Hepatic/Renal ROS            Endo/Other:    (+) malignancy/cancer. ROS comment: R hip fx Abdominal:           Vascular: negative vascular ROS. Anesthesia Plan      general and spinal     ASA 2     (Prefers GA)  Induction: intravenous. Anesthetic plan and risks discussed with patient.                     Hodan Light MD   9/6/2020

## 2020-09-06 NOTE — DISCHARGE INSTR - COC
Continuity of Care Form    Patient Name: Sanford Zacarias   :  1940  MRN:  [id number]    Admit date:  2020  Discharge date:  9/10/20    Code Status Order: Prior   Advance Directives:   [facility] Documentation       Date/Time Healthcare Directive Type of Healthcare Directive Copy in [facility] [address] Agent's Name Healthcare Agent's Phone Number    20 0044  No, patient does not have an advance directive for healthcare treatment -- -- -- -- --            Admitting Physician:  Sharon Olson DO  PCP: Sharon Olson DO    Discharging Nurse: [address] Unit/Room#: 2538/2168-S  Discharging Unit Phone Number: [phone]    Emergency Contact:   Extended Emergency Contact Information  Primary Emergency Contact: Faith Florez [address] [place] [address] Phone: [phone]  Relation: Other    Past Surgical History:  Past Surgical History:   Procedure Laterality Date    BREAST SURGERY      pt unknown what breast lump removed    HYSTERECTOMY      pt unsure of med hx    SKIN BIOPSY         Immunization History: There is no immunization history on file for this patient.     Active Problems:  Patient Active Problem List   Diagnosis Code    SCC (squamous cell carcinoma) C44.92    Melanoma ([facility] [address] C43.9    Closed displaced fracture of right femoral neck ([facility] [address] S72.001A       Isolation/Infection:   Isolation            No Isolation          Patient Infection Status       None to display            Nurse Assessment:  Last Vital Signs: BP (!) 147/71   Pulse 83   Temp 98.3 °F (36.8 °C) (Oral)   Resp 18   SpO2 98%     Last documented pain score (0-10 scale): Pain Level: 5  Last Weight:   Wt Readings from Last 1 Encounters:   19 150 lb (68 kg)     Mental Status:  oriented, coherent and logical    IV Access:  - None    Nursing Mobility/ADLs:  Walking   Assisted  Transfer  Assisted  Bathing  Assisted  Dressing  Assisted  Toileting Discharge: Stable    Rehab Potential (if transferring to Rehab): {Prognosis:[id number]}    Recommended Labs or Other Treatments After Discharge: ***    Physician Certification: I certify the above information and transfer of Layla Leroy  is necessary for the continuing treatment of the diagnosis listed and that she requires Andrew Sebastian for less 30 days.      Update Admission H&P: {CHP DME Changes in MKAPY:[id number]}    PHYSICIAN SIGNATURE:  Electronically signed by Leonora Humphries DO on 9/10/20 at 11:16 AM EDT

## 2020-09-06 NOTE — ED PROVIDER NOTES
General: She is not in acute distress. Appearance: Normal appearance. HENT:      Head: Normocephalic and atraumatic. Nose: No congestion or rhinorrhea. Mouth/Throat:      Mouth: Mucous membranes are moist.      Pharynx: Oropharynx is clear. Eyes:      Extraocular Movements: Extraocular movements intact. Pupils: Pupils are equal, round, and reactive to light. Neck:      Musculoskeletal: Normal range of motion. No neck rigidity or muscular tenderness. Cardiovascular:      Rate and Rhythm: Normal rate and regular rhythm. Heart sounds: No murmur. No gallop. Pulmonary:      Effort: Pulmonary effort is normal. No respiratory distress. Breath sounds: No wheezing, rhonchi or rales. Abdominal:      General: Abdomen is flat. Palpations: Abdomen is soft. There is no mass. Tenderness: There is no abdominal tenderness. There is no guarding. Hernia: No hernia is present. Musculoskeletal:         General: No swelling or signs of injury. Right hip: She exhibits decreased range of motion, decreased strength, tenderness and bony tenderness. She exhibits no deformity. Comments: Positive pain to right hip with leg roll. Right lower extremity neurovascular intact dorsalis pedis and posterior tibialis pulses in place. Patient able to dorsiflex and plantarflex. Sensation intact grossly to light touch. Skin:     General: Skin is warm and dry. Capillary Refill: Capillary refill takes less than 2 seconds. Neurological:      General: No focal deficit present. Mental Status: She is alert and oriented to person, place, and time. Mental status is at baseline. Motor: No weakness.    Psychiatric:         Mood and Affect: Mood normal.          Procedures       MDM  Number of Diagnoses or Management Options  Closed fracture of neck of right femur, initial encounter [facility]):   Diagnosis management comments: Patient 70-year-old female presenting after suffering fall from home. On presentation vital signs stable. On physical exam patient had no focal logical deficits. No C-spine tenderness. Patient does note tenderness to right hip she does have positive logroll. Right lower extremity neurovascular intact dorsalis pedis and posterior tibialis pulses palpable. Patient able to dorsiflex and plantarflex against resistance. CT scan of the head and neck was obtained which was unremarkable. X-rays of the right hip and pelvis along with right femur and right ankle were obtained. Pelvis and right hip x-rays demonstrated displaced femoral neck fracture. Call was placed to orthopedic surgery for consultation. Orthopedics recommended medicine admission and will see patient in consultation. Patient given Percocet for pain. Spoke with medicine who agreed to meet patient. Plan of care discussed with patient patient is agreeable for admission. Patient was admitted in stable condition. ED Course as of Sep 06 0033   Sat Sep 05, 2020   2330 Spoke with ortho Dr. Pablito Parnell recommended medical admission he will see patient in consultation.    [BP]   [phone] with Dr. Shobha Hayes, he will admit patient. [BP]      ED Course User Index  [BP] Jose C Reid, DO        --------------------------------------------- PAST HISTORY ---------------------------------------------  Past Medical History:  has a past medical history of Hyperlipidemia and SCC (squamous cell carcinoma). Past Surgical History:  has a past surgical history that includes Breast surgery; Hysterectomy (2005); and skin biopsy. Social History:  reports that she has never smoked. She has never used smokeless tobacco. She reports that she does not drink alcohol or use drugs. Family History: family history includes Cancer in her father. The patients home medications have been reviewed. Allergies: Patient has no known allergies.     -------------------------------------------------- RESULTS XR ANKLE RIGHT (MIN 3 VIEWS)   Final Result      NO SIGNIFICANT BONY ABNORMALITY IN THE RIGHT ANKLE      Severe right lateral malleolus soft tissue swelling. XR FEMUR RIGHT (MIN 2 VIEWS)   Final Result       Right femoral neck fracture with the distal fracture fragment   displaced cephalad. XR TIBIA FIBULA RIGHT (2 VIEWS)   Final Result      NORMAL RIGHT TIBIA AND FIBULA. NO EVIDENCE OF FRACTURE OR DISLOCATION      Severe right lateral malleolus soft tissue swelling. CT Head WO Contrast   Final Result      No evidence for acute intracranial process. Cortical atrophy and chronic periventricular microangiopathy. Mild rotary subluxation of C1-2 is probably chronic. A fracture is not   detected. CT Cervical Spine WO Contrast   Final Result      No acute fracture or spondylolisthesis is identified on CT of cervical   spine. Diffuse degenerative disc and facet disease result in no severe   central or foraminal stenosis. Thyroid nodules are present. EKG:  This EKG is signed and interpreted by me. Rate: 71  Rhythm: Sinus  Interpretation: no acute changes  Comparison: stable as compared to patient's most recent EKG      ------------------------- NURSING NOTES AND VITALS REVIEWED ---------------------------  Date / Time Roomed:  9/5/2020  9:26 PM  ED Bed Assignment:  07/07    The nursing notes within the ED encounter and vital signs as below have been reviewed.      Patient Vitals for the past 24 hrs:   BP Temp Temp src Pulse Resp SpO2   09/05/20 2326 (!) 159/60 97.5 °F (36.4 °C) Oral 72 18 96 %   09/05/20 2144 (!) 160/72 97.6 °F (36.4 °C) Oral 70 16 95 %       Oxygen Saturation Interpretation: Normal    ------------------------------------------ PROGRESS NOTES ------------------------------------------  Re-evaluation(s):  Time: 7300  Patients symptoms show no change  Repeat physical examination is not changed    Counseling:  I have spoken with the patient and discussed todays results, in addition to providing specific details for the plan of care and counseling regarding the diagnosis and prognosis. Their questions are answered at this time and they are agreeable with the plan of admission.    --------------------------------- ADDITIONAL PROVIDER NOTES ---------------------------------  Consultations:  Time: 2345. Spoke with Dr. Cinthia Morelos. Discussed case. They will admit the patient. This patient's ED course included: a personal history and physicial examination    This patient has remained hemodynamically stable during their ED course. Diagnosis:  1. Closed fracture of neck of right femur, initial encounter ([facility] [address]        Disposition:  Patient's disposition: Admit to med/surg floor  Patient's condition is good. Patient was seen and evaluated by myself and my attending Mireya Kelly DO. Assessment and Plan discussed with attending provider, please see attestation for final plan of care.      DO Daily Donohue DO  Resident  09/06/20 6353

## 2020-09-06 NOTE — PROGRESS NOTES
[facility] Quality Flow/Interdisciplinary Rounds Progress Note        Quality Flow Rounds held on September 6, 2020    Disciplines Attending:  Bedside Nurse, ,  and Nursing Unit Leadership    Wally Azul was admitted on 9/5/2020  9:26 PM    Anticipated Discharge Date:  Expected Discharge Date: 09/08/20    Disposition:    Landon Score:  Landon Scale Score: 17    Readmission Risk              Risk of Unplanned Readmission:        8           Discussed patient goal for the day, patient clinical progression, and barriers to discharge.   The following Goal(s) of the Day/Commitment(s) have been identified:  Pain Control      An Langley  September 6, 2020

## 2020-09-06 NOTE — ED NOTES
Pt tp room via ambulance. Per pt she was sitting in a chair. Her leg became numb, she tried to stand, had no strength in leg and fell.      Carin Schmidt RN  09/05/20 5923

## 2020-09-06 NOTE — H&P
[address] [address]                              HISTORY AND PHYSICAL    PATIENT NAME: Dom Brock                     :        1940  MED REC NO:   [id number]                            ROOM:       [id number]  ACCOUNT NO:   [de-identified]                           ADMIT DATE: 2020  PROVIDER:     Vinayak Epstein DO    HISTORY OF PRESENT ILLNESS:  This a 65-year-old female, presented to the  emergency department after having a fall. She had immediate pain in her  right leg and right hip. She was unable to stand or unable to bear full  weight. She complains that the pain is severe and constant. She had an  x-ray obtained in the emergency department, which showed a femoral neck  fracture of the right hip, and she was subsequently admitted for  orthopedic consultation. RECENT CURRENT MEDICATIONS:  Included Zocor 40 q.d. PAST MEDICAL HISTORY:  Significant for history of melanoma, squamous  cell carcinoma of the skin, hyperlipidemia. She has had surgeries that  included hysterectomy, breast surgery, skin biopsies, and removal of  skin tumors. SOCIAL HISTORY:  She is a lifelong nonsmoker and nondrinker. Denies use  of narcotic drugs or alcoholic beverages. FAMILY MEDICAL HISTORY:  Noted and discussed. REVIEW OF SYSTEMS:  Negative for vertigo, cephalgia, ringing in the  ears, hearing loss, difficulty swallowing, hoarseness in her voice. She  has no chest pain, palpitations, orthopnea, paroxysmal nocturnal  dyspnea, or edema. There is no cough, wheeze, shortness of breath,  hemoptysis, or pleuritic pain. There is no nausea, vomiting, diarrhea,  or constipation. No blood in the stool. No recent change in weight. There is no urgency, frequency, dysuria, hematuria. The endocrine  system is negative for diabetes or thyroid disorder. The  musculoskeletal system is positive for recent hip fracture. Her  integumentary system is positive for melanoma and squamous cell  carcinoma in the past.  Psychiatric system negative for anxiety or  depression. Neurologic system negative for CVA, seizures, tremors,  tics, or TIAs. PHYSICAL EXAMINATION:  GENERAL:  Shows this to be a 73-year-old white female in moderate  distress with the above complaints. HEAD, EYES, EARS, NOSE, AND THROAT:  Examination shows the head to be  normocephalic and atraumatic. Pupils are equal and reactive to light  and accommodation. Extraocular eye muscles are intact. Tympanic  membranes are clear. Ear canals are patent. Oral mucosa pink and  moist.  NECK:  Neck veins are flat, nondistended. No carotid bruits. CHEST:  Symmetrical.  HEART:  Had a regular rate and rhythm without murmur, gallops, or  friction rub. LUNGS:  Clear to auscultation bilaterally without rhonchi, rales, or  wheezes. ABDOMEN:  Soft, nontender, nondistended. Bowel sounds are present in  all four quadrants. EXTERNAL GENITALIA:  Intact. EXTREMITIES:  Peripheral pulses are okay. Legs are without edema. There is pain noted over the right greater trochanter. There is an  externally rotated hip. There is extreme pain with movement of any  sorts of the hip. She is unable to bear weight. IMPRESSION:  Initial impression at this time is femoral neck fracture. The patient will be admitted. Appropriate pain control will be given  and orthopedic consult will be asked for. Anticipate surgical  correction. At the time of admission, her condition is fair. Her  prognosis is guarded.         Kimberly Hua DO    D: 09/06/2020 9:34:10       T: 09/06/2020 9:36:44     [email or web address]  Job#: [id number]     Doc#: [id number]    CC:

## 2020-09-06 NOTE — PROGRESS NOTES
Nurse to nurse report give to 5W RN. All orthopedic questions answered. Discussed ortho post-op care as well. Patient is able to ambulate with front wheeled walker once stable post-operatively. PCD, teds, and SMI post-operatively. Nice to be removed POD#1 after patient sees therapy.
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
## 2020-09-07 ENCOUNTER — APPOINTMENT (OUTPATIENT)
Dept: GENERAL RADIOLOGY | Age: 80
DRG: 301 | End: 2020-09-07
Payer: COMMERCIAL

## 2020-09-07 LAB
ANION GAP SERPL CALCULATED.3IONS-SCNC: 8 MMOL/L (ref 7–16)
BUN BLDV-MCNC: 10 MG/DL (ref 8–23)
CALCIUM SERPL-MCNC: 8.7 MG/DL (ref 8.6–10.2)
CHLORIDE BLD-SCNC: 99 MMOL/L (ref 98–107)
CO2: 25 MMOL/L (ref 22–29)
CREAT SERPL-MCNC: 0.7 MG/DL (ref 0.5–1)
GFR AFRICAN AMERICAN: >60
GFR NON-AFRICAN AMERICAN: >60 ML/MIN/1.73
GLUCOSE BLD-MCNC: 149 MG/DL (ref 74–99)
HCT VFR BLD CALC: 34.6 % (ref 34–48)
HEMOGLOBIN: 11.2 G/DL (ref 11.5–15.5)
MCH RBC QN AUTO: 29.4 PG (ref 26–35)
MCHC RBC AUTO-ENTMCNC: 32.4 % (ref 32–34.5)
MCV RBC AUTO: 90.8 FL (ref 80–99.9)
PDW BLD-RTO: 12.7 FL (ref 11.5–15)
PLATELET # BLD: 238 E9/L (ref 130–450)
PMV BLD AUTO: 9.6 FL (ref 7–12)
POTASSIUM REFLEX MAGNESIUM: 4.1 MMOL/L (ref 3.5–5)
RBC # BLD: 3.81 E12/L (ref 3.5–5.5)
SODIUM BLD-SCNC: 132 MMOL/L (ref 132–146)
WBC # BLD: 13.5 E9/L (ref 4.5–11.5)

## 2020-09-07 PROCEDURE — 97161 PT EVAL LOW COMPLEX 20 MIN: CPT

## 2020-09-07 PROCEDURE — 97110 THERAPEUTIC EXERCISES: CPT

## 2020-09-07 PROCEDURE — 2580000003 HC RX 258: Performed by: ORTHOPAEDIC SURGERY

## 2020-09-07 PROCEDURE — 6370000000 HC RX 637 (ALT 250 FOR IP): Performed by: ORTHOPAEDIC SURGERY

## 2020-09-07 PROCEDURE — 36415 COLL VENOUS BLD VENIPUNCTURE: CPT

## 2020-09-07 PROCEDURE — 72170 X-RAY EXAM OF PELVIS: CPT

## 2020-09-07 PROCEDURE — 97116 GAIT TRAINING THERAPY: CPT

## 2020-09-07 PROCEDURE — 1200000000 HC SEMI PRIVATE

## 2020-09-07 PROCEDURE — 85027 COMPLETE CBC AUTOMATED: CPT

## 2020-09-07 PROCEDURE — 6360000002 HC RX W HCPCS: Performed by: ORTHOPAEDIC SURGERY

## 2020-09-07 PROCEDURE — 97530 THERAPEUTIC ACTIVITIES: CPT

## 2020-09-07 PROCEDURE — 80048 BASIC METABOLIC PNL TOTAL CA: CPT

## 2020-09-07 PROCEDURE — 97165 OT EVAL LOW COMPLEX 30 MIN: CPT

## 2020-09-07 PROCEDURE — 6370000000 HC RX 637 (ALT 250 FOR IP): Performed by: GENERAL PRACTICE

## 2020-09-07 RX ADMIN — Medication 2 G: at 09:15

## 2020-09-07 RX ADMIN — DOCUSATE SODIUM 50 MG AND SENNOSIDES 8.6 MG 1 TABLET: 8.6; 5 TABLET, FILM COATED ORAL at 20:11

## 2020-09-07 RX ADMIN — OXYCODONE HYDROCHLORIDE 10 MG: 5 TABLET ORAL at 01:10

## 2020-09-07 RX ADMIN — SODIUM CHLORIDE, PRESERVATIVE FREE 10 ML: 5 INJECTION INTRAVENOUS at 09:16

## 2020-09-07 RX ADMIN — ATORVASTATIN CALCIUM 20 MG: 20 TABLET, FILM COATED ORAL at 09:16

## 2020-09-07 RX ADMIN — Medication 2 G: at 00:31

## 2020-09-07 RX ADMIN — DOCUSATE SODIUM 50 MG AND SENNOSIDES 8.6 MG 1 TABLET: 8.6; 5 TABLET, FILM COATED ORAL at 09:16

## 2020-09-07 RX ADMIN — ASPIRIN 81 MG: 81 TABLET, COATED ORAL at 09:16

## 2020-09-07 RX ADMIN — ASPIRIN 81 MG: 81 TABLET, COATED ORAL at 20:11

## 2020-09-07 RX ADMIN — SODIUM CHLORIDE: 9 INJECTION, SOLUTION INTRAVENOUS at 04:44

## 2020-09-07 RX ADMIN — SODIUM CHLORIDE, PRESERVATIVE FREE 10 ML: 5 INJECTION INTRAVENOUS at 20:12

## 2020-09-07 ASSESSMENT — PAIN SCALES - GENERAL
PAINLEVEL_OUTOF10: 9
PAINLEVEL_OUTOF10: 0
PAINLEVEL_OUTOF10: 5
PAINLEVEL_OUTOF10: 0

## 2020-09-07 ASSESSMENT — PAIN DESCRIPTION - DESCRIPTORS: DESCRIPTORS: CONSTANT;DISCOMFORT

## 2020-09-07 ASSESSMENT — PAIN DESCRIPTION - PROGRESSION: CLINICAL_PROGRESSION: GRADUALLY WORSENING

## 2020-09-07 ASSESSMENT — PAIN DESCRIPTION - PAIN TYPE: TYPE: SURGICAL PAIN

## 2020-09-07 ASSESSMENT — PAIN DESCRIPTION - FREQUENCY: FREQUENCY: CONTINUOUS

## 2020-09-07 ASSESSMENT — PAIN DESCRIPTION - ORIENTATION: ORIENTATION: RIGHT

## 2020-09-07 ASSESSMENT — PAIN DESCRIPTION - ONSET: ONSET: ON-GOING

## 2020-09-07 ASSESSMENT — PAIN - FUNCTIONAL ASSESSMENT: PAIN_FUNCTIONAL_ASSESSMENT: PREVENTS OR INTERFERES SOME ACTIVE ACTIVITIES AND ADLS

## 2020-09-07 ASSESSMENT — PAIN DESCRIPTION - LOCATION: LOCATION: HIP

## 2020-09-07 NOTE — PROGRESS NOTES
1945 admitted to [facility] right hip dressing with aquacel surgical ag intact   1953  X-ray of right hip done post op in [facility]

## 2020-09-07 NOTE — PROGRESS NOTES
Physical Therapy  Initial Eval    Attending Provider:  Brigitte Lizama DO    Evaluating PT:  Dinora Goode PT    Room #:  9291/9166-M  Diagnosis:  Closed fracture of neck of right femur  Pertinent PMHx/PSHx:  S/P RT Hip Manuel   Procedure/Surgery:  RT Hip Manuel 9/6/20  Precautions:  WBAT, [place] hip prec, falls,   Equipment Needs:  [employer],     SUBJECTIVE:    Pt lives with alone in a 1 story Apt with 0 stairs and 0 rail to enter; elevator to floor. Pt ambulated with No AD PTA. No prior falls reported. OBJECTIVE:   Initial Evaluation  Date: 9/7/2020 Treatment Short Term/ Long Term   Goals   Was pt agreeable to Eval/treatment? Yes     Does pt have pain? Min RT hip     Bed Mobility  Rolling: CGA  Supine to sit: Min  Sit to supine: NA/hip chair transfer  Scooting: SBA/S  SBA/CGA all levels    Transfers Sit to stand: Min/CGA  Stand to sit: SBA/S  Stand pivot: NA  SBA/S all surfaces with good safety awareness   Ambulation   10 feet with [employer] Min A. See below  40+ feet with [employer] CGA/SBA   Stair negotiation: ascended and descended  NA     AM-PAC 6 Clicks 71/03       Pt is A & O x 3  RTLE ROM: hip flex AA/A 80*, knee/ankle WNL  MMT: RT hip 3-/5, knee 4/5, ankle 4/5   Sensation:  Pt denies numbness and tingling to RTLE  Edema:  RT hip  Balance: sitting is Indep and standing with [employer] is CGA  Endurance: Functional, non dyspnea @ 10' walk     Therapeutic Exercises:  AA/AROM RT hip flex x 20, LAQ x 20, AP,x 20    Patient education  Pt educated on WBAT, [place] hip, transfer hand place, bed mobility,     Patient response to education: Good understanding universal hip prec. Review daily. Pt verbalized understanding Pt demonstrated skill Pt requires further education in this area   Yes Yes Yes     ASSESSMENT:    Comments:  Highly cooperative and pleasant. Alert and oriented x 3. Appears to fully understand all universal hip prec; review daily to ensure uptake.   Initially difficulty with loading RTLE; would not place weight on LE standing. Improving loading tolerances RTLE after 3-5' walking but continued to guard/limit loading. Narrow AME needing assist to guide WW in room. Safe transfer into hip chair with waffle cushion and chair alarm placed by aid prior to seated chair. Chair alarm ON position. Treatment:  Patient practiced and was instructed in the following treatment:     Eval   There-ex   Gait 10'   Education  Pt was left phone and with call light left by patient. Chair/bed alarm: On chair    Pt's/ family goals   1. \"To get better\"    Patient and or family understand(s) diagnosis, prognosis, and plan of care. PLAN:    PT care will be provided in accordance with the objectives noted above. Exercises and functional mobility practice will be used as well as appropriate assistive devices or modalities to obtain goals. Patient and family education will also be administered as needed. Frequency of treatments: 5x/week x 1-2 weeks. BID     Evaluation Time includes thorough review of current medical information, gathering information on past medical history/social history and prior level of function, completion of standardized testing/informal observation of tasks, assessment of data and education on plan of care and goals.     CPT codes:  [x] Low Complexity PT evaluation 34501  [] Moderate Complexity PT evaluation 08963  [] High Complexity PT evaluation 69523  [] PT Re-evaluation 22697  [x] Gait training 18919 5 minutes  [] Manual therapy 93857 ** minutes  [x] Therapeutic activities 89088 10 minutes  [] Therapeutic exercises 38879 ** minutes  [] Neuromuscular reeducation 17139 ** minutes    Dhiraj Ta PT

## 2020-09-07 NOTE — PLAN OF CARE
Problem: Skin Integrity:  Goal: Will show no infection signs and symptoms  Description: Will show no infection signs and symptoms  9/6/2020 2247 by Annie Beal RN  Outcome: Completed  9/6/2020 0940 by Dean Patel RN  Outcome: Met This Shift  Goal: Absence of new skin breakdown  Description: Absence of new skin breakdown  Outcome: Completed  Goal: Demonstration of wound healing without infection will improve  Description: Demonstration of wound healing without infection will improve  Outcome: Completed  Goal: Risk for impaired skin integrity will decrease  Description: Risk for impaired skin integrity will decrease  Outcome: Completed     Problem: Pain:  Goal: Pain level will decrease  Description: Pain level will decrease  Outcome: Completed  Goal: Control of acute pain  Description: Control of acute pain  Outcome: Completed  Goal: Control of chronic pain  Description: Control of chronic pain  Outcome: Completed     Problem: Sensory:  Goal: Pain level will decrease  Description: Pain level will decrease  Outcome: Completed     Problem: Falls - Risk of:  Goal: Will remain free from falls  Description: Will remain free from falls  9/6/2020 0940 by Dean Patel RN  Outcome: Met This Shift

## 2020-09-07 NOTE — PROGRESS NOTES
Occupational Therapy  OCCUPATIONAL THERAPY INITIAL EVALUATION      Date:2020  Patient Name: Ash Eaton  MRN: [id number]  : 1940  Room: [address] [place], [place] [address]    Referring Provider:  Caleen Cogan, DO    Evaluating OT: Bakari Rivera OTR/L [id number]    AM-PAC Daily Activity Raw Score: 15/24    Recommended Adaptive Equipment: TBD     Diagnosis: femur fracture. Fall at home. S/p   HIP HEMIARTHROPLASTY (Right Hip) 2020  Pertinent Medical History: squaumous cell carcinoma    Precautions:  Falls, WBAT R LE, alarm,standard hip precautions      Home Living: Pt lives alone, 1 floor aprtment with no steps.    Tub/shower unit with grab bars  Prior Level of Function: Independent  with ADLs , Independent  with IADLs; ambulated no device     Pain Level: R LE discomfort/stiffness ;   Cognition:  Oriented,alert and conversing     Judgement/safety:  Fair -              Memory forgetful      Functional Assessment:   Initial Eval Status  Date: 20 Tx Session  STGs = LTGs  1-2 weeks    Feeding Set-up      Grooming SBA/set-up ,seated   Decrease standing tolerance, balance and safety   Mod I    UB Dressing Min A  Don/doff hospital gown   Mod I    LB Dressing Max A   Mod I    Bathing Max A   mod I    Toileting Assist with hygiene      Bed Mobility  Up in chair upon arrival      Functional Transfers Min A  Sit-stand from elevated chair   Mod I    Functional Mobility Mod A,w/walker   Steps next to chair   Cueing for tech, walker safety   Unsteadiness   Mod I with good tolerance    Balance Sitting:     Static:  Supervision     Standing: Mod/Min A      Activity Tolerance Fair with light activity   Good  with ADL activity   Visual/  Perceptual Glasses: none by bedside                 Right  hand dominant    Strength ROM Additional Info:    RUE  4-/5  WFL good  and wfl FMC/dexterity noted during ADL tasks       LUE 4-/5  WFL good  and wfl FMC/dexterity noted during ADL tasks         [facility]   Sensation:  No c/o numbness or tingling     Comments:      Upon arrival, patient sitting up in chair. At end of session, patient returned to chair with call light and phone within reach, all lines and tubes intact. . Overall patient demonstrated  decreased independence and safety during completion of ADL/functional transfer/mobility tasks. .  Based on patient's functional performance as documented above  and prior level of function, patient would benefit from continued skilled OT during/following hospital stay in an effort to increase functional safety, independence with ADLS/IADLS, and overall quality of life. ALARM ON       Assessment of current deficits   Functional mobility [x]  ADLs [x] Strength [x]  Cognition []  Functional transfers  [x] IADLs [x] Safety Awareness [x]  Endurance [x]  Fine Motor Coordination [] Balance [x] Vision/perception [] Sensation []   Gross Motor Coordination [] ROM [] Delirium []                  Motor Control []    Plan of Care: OT 1-3x/week PRN  ADL retraining [x]   Equipment needs [x]   Neuromuscular re-education [x] Energy Conservation Techniques [x]  Functional Transfer training [x] Patient and/or Family Education [x]  Functional Mobility training [x]  Environmental Modifications [x]  Cognitive re-training []   Compensatory techniques for ADLs [x]  Splinting Needs []   Positioning to improve overall function [x]   Therapeutic Activity [x]  Therapeutic Exercise  [x]  Visual/Perceptual: []    Delirium prevention/treatment  []   Other:  []    Rehab Potential: Good for established goals     Patient / Family Goal: rehab       Patient and/or family were instructed on functional diagnosis, prognosis/goals and OT plan of care. Demonstrated good  understanding.     Low  Evaluation   [id number]      Evaluation time includes thorough review of current medical information, gathering information on past medical history/social history and prior level of function, completion of standardized testing/informal observation of tasks, assessment of data, and development of POC/Goals      Suzie Helton OTR/L [id number]

## 2020-09-07 NOTE — PLAN OF CARE
Problem: Falls - Risk of:  Goal: Will remain free from falls  Description: Will remain free from falls  Outcome: Met This Shift     Problem:  Activity:  Goal: Ability to ambulate will improve  Description: Ability to ambulate will improve  9/7/2020 1102 by Esther Bernardo RN  Outcome: Met This Shift

## 2020-09-07 NOTE — PLAN OF CARE
Problem:  Activity:  Goal: Ability to ambulate will improve  Description: Ability to ambulate will improve  Outcome: Ongoing  Goal: Ability to perform activities at highest level will improve  Description: Ability to perform activities at highest level will improve  Outcome: Ongoing     Problem: Health Behavior:  Goal: Identification of resources available to assist in meeting health care needs will improve  Description: Identification of resources available to assist in meeting health care needs will improve  Outcome: Ongoing     Problem: Nutritional:  Goal: Ability to attain and maintain optimal nutritional status will improve  Description: Ability to attain and maintain optimal nutritional status will improve  Outcome: Ongoing     Problem: Physical Regulation:  Goal: Will remain free from infection  Description: Will remain free from infection  Outcome: Ongoing  Goal: Postoperative complications will be avoided or minimized  Description: Postoperative complications will be avoided or minimized  Outcome: Ongoing  Goal: Diagnostic test results will improve  Description: Diagnostic test results will improve  Outcome: Ongoing     Problem: Respiratory:  Goal: Ability to maintain adequate ventilation will improve  Description: Ability to maintain adequate ventilation will improve  Outcome: Ongoing     Problem: Safety:  Goal: Ability to remain free from injury will improve  Description: Ability to remain free from injury will improve  Outcome: Ongoing     Problem: Self-Care:  Goal: Ability to meet self-care needs will improve  Description: Ability to meet self-care needs will improve  Outcome: Ongoing     Problem: Self-Concept:  Goal: Ability to maintain and perform role responsibilities to the fullest extent possible will improve  Description: Ability to maintain and perform role responsibilities to the fullest extent possible will improve  Outcome: Ongoing  Goal: Verbalizations of decreased anxiety will increase  Description: Verbalizations of decreased anxiety will increase  Outcome: Ongoing     Problem: Tissue Perfusion:  Goal: Peripheral tissue perfusion will improve  Description: Peripheral tissue perfusion will improve  Outcome: Ongoing  Goal: Risk of venous thrombosis will decrease  Description: Risk of venous thrombosis will decrease  Outcome: Ongoing     Problem: Urinary Elimination:  Goal: Ability to reestablish a normal urinary elimination pattern will improve - after catheter removal  Description: Ability to reestablish a normal urinary elimination pattern will improve  Outcome: Ongoing

## 2020-09-07 NOTE — ANESTHESIA POSTPROCEDURE EVALUATION
Department of Anesthesiology  Postprocedure Note    Patient: Wally Azul  MRN: [id number]  YOB: 1940  Date of evaluation: 9/7/2020  Time:  3:43 AM     Procedure Summary     Date:  09/06/20 Room / Location:  [facility] OR 05 / [facility]    Anesthesia Start:  0403 Anesthesia Stop:  1948    Procedure:  HIP HEMIARTHROPLASTY (Right Hip) Diagnosis:  (fracture)    Surgeon:  Brie Blackwood MD Responsible Provider:  Indu Crum MD    Anesthesia Type:  general, spinal ASA Status:  2          Anesthesia Type: general, spinal    Shan Phase I: Shan Score: 9    Shan Phase II:      Last vitals: Reviewed and per EMR flowsheets.        Anesthesia Post Evaluation    Patient location during evaluation: PACU  Patient participation: complete - patient participated  Level of consciousness: awake and alert  Airway patency: patent  Nausea & Vomiting: no vomiting and no nausea  Complications: no  Cardiovascular status: blood pressure returned to baseline  Respiratory status: acceptable  Hydration status: euvolemic

## 2020-09-07 NOTE — PROGRESS NOTES
Physical Therapy    S: In bed on arrival receptive for PM session. Pain under control and no increase from AM session. Wants to get back into chair after walk and stay in chair. Review of universal hip precautions provided. O: Bed mob: Min assisted   Sit bal: Indep  Transfers: bed to stand min assist x 1/CGA  ROM: RT hip AA flex 85*, knee/ankle WFL  MMT: RT hip 3-/5, knee/ankle 4-5/5  Amb: [employer] CGA 40' (self-limited fatigue/pain)  There-x: RTLE  Education: review hip prec    A: Highly motivated and cooperative. Improving ROM, MMT and general mobility tolerances. Improved loading RTLE during transfers and gait PM session with lessened antalgias. Step to non reciprocal gait pattern without drifting, lateral veering or LOB. General fatigue walking requiring brief < 10 sec static standing breaks during gait. No dyspnea or overt signs pulmonary distress. Reviewed universal hip precautions and appears to understand but cannot give me details on them. Tolerated all there-ex very well. Remained in hip chair with WC and chair alarm;activated. P: Cont.  POC as genet  [facility] 15/24

## 2020-09-08 LAB
ANION GAP SERPL CALCULATED.3IONS-SCNC: 8 MMOL/L (ref 7–16)
BUN BLDV-MCNC: 13 MG/DL (ref 8–23)
CALCIUM SERPL-MCNC: 9 MG/DL (ref 8.6–10.2)
CHLORIDE BLD-SCNC: 102 MMOL/L (ref 98–107)
CO2: 24 MMOL/L (ref 22–29)
CREAT SERPL-MCNC: 0.6 MG/DL (ref 0.5–1)
GFR AFRICAN AMERICAN: >60
GFR NON-AFRICAN AMERICAN: >60 ML/MIN/1.73
GLUCOSE BLD-MCNC: 140 MG/DL (ref 74–99)
HCT VFR BLD CALC: 30.4 % (ref 34–48)
HEMOGLOBIN: 10.1 G/DL (ref 11.5–15.5)
MCH RBC QN AUTO: 29.4 PG (ref 26–35)
MCHC RBC AUTO-ENTMCNC: 33.2 % (ref 32–34.5)
MCV RBC AUTO: 88.6 FL (ref 80–99.9)
MRSA CULTURE ONLY: NORMAL
PDW BLD-RTO: 12.8 FL (ref 11.5–15)
PLATELET # BLD: 198 E9/L (ref 130–450)
PMV BLD AUTO: 9.7 FL (ref 7–12)
POTASSIUM REFLEX MAGNESIUM: 3.8 MMOL/L (ref 3.5–5)
RBC # BLD: 3.43 E12/L (ref 3.5–5.5)
SODIUM BLD-SCNC: 134 MMOL/L (ref 132–146)
WBC # BLD: 13.3 E9/L (ref 4.5–11.5)

## 2020-09-08 PROCEDURE — 80048 BASIC METABOLIC PNL TOTAL CA: CPT

## 2020-09-08 PROCEDURE — 97535 SELF CARE MNGMENT TRAINING: CPT

## 2020-09-08 PROCEDURE — 97530 THERAPEUTIC ACTIVITIES: CPT

## 2020-09-08 PROCEDURE — 6370000000 HC RX 637 (ALT 250 FOR IP): Performed by: ORTHOPAEDIC SURGERY

## 2020-09-08 PROCEDURE — 1200000000 HC SEMI PRIVATE

## 2020-09-08 PROCEDURE — 85027 COMPLETE CBC AUTOMATED: CPT

## 2020-09-08 PROCEDURE — 97110 THERAPEUTIC EXERCISES: CPT

## 2020-09-08 PROCEDURE — 2580000003 HC RX 258: Performed by: GENERAL PRACTICE

## 2020-09-08 PROCEDURE — 2580000003 HC RX 258: Performed by: ORTHOPAEDIC SURGERY

## 2020-09-08 PROCEDURE — 36415 COLL VENOUS BLD VENIPUNCTURE: CPT

## 2020-09-08 PROCEDURE — 6370000000 HC RX 637 (ALT 250 FOR IP): Performed by: GENERAL PRACTICE

## 2020-09-08 RX ORDER — SODIUM CHLORIDE 9 MG/ML
INJECTION, SOLUTION INTRAVENOUS ONCE
Status: COMPLETED | OUTPATIENT
Start: 2020-09-08 | End: 2020-09-08

## 2020-09-08 RX ADMIN — ATORVASTATIN CALCIUM 20 MG: 20 TABLET, FILM COATED ORAL at 08:39

## 2020-09-08 RX ADMIN — DOCUSATE SODIUM 50 MG AND SENNOSIDES 8.6 MG 1 TABLET: 8.6; 5 TABLET, FILM COATED ORAL at 21:53

## 2020-09-08 RX ADMIN — ASPIRIN 81 MG: 81 TABLET, COATED ORAL at 08:39

## 2020-09-08 RX ADMIN — SODIUM CHLORIDE: 9 INJECTION, SOLUTION INTRAVENOUS at 21:05

## 2020-09-08 RX ADMIN — SODIUM CHLORIDE, PRESERVATIVE FREE 10 ML: 5 INJECTION INTRAVENOUS at 08:39

## 2020-09-08 RX ADMIN — DOCUSATE SODIUM 50 MG AND SENNOSIDES 8.6 MG 1 TABLET: 8.6; 5 TABLET, FILM COATED ORAL at 08:39

## 2020-09-08 RX ADMIN — ASPIRIN 81 MG: 81 TABLET, COATED ORAL at 21:53

## 2020-09-08 ASSESSMENT — PAIN SCALES - GENERAL
PAINLEVEL_OUTOF10: 0
PAINLEVEL_OUTOF10: 0

## 2020-09-08 NOTE — PLAN OF CARE
Problem: Falls - Risk of:  Goal: Will remain free from falls  Description: Will remain free from falls  9/8/2020 0923 by Isadora Madden RN  Outcome: Met This Shift     Problem: Falls - Risk of:  Goal: Absence of physical injury  Description: Absence of physical injury  9/8/2020 0923 by Isadora Madden RN  Outcome: Met This Shift     Problem: Activity:  Goal: Ability to ambulate will improve  Description: Ability to ambulate will improve  9/8/2020 0923 by Isadora Madden RN  Outcome: Met This Shift     Problem:  Activity:  Goal: Ability to perform activities at highest level will improve  Description: Ability to perform activities at highest level will improve  9/8/2020 0923 by Isadora Madden RN  Outcome: Met This Shift     Problem: Health Behavior:  Goal: Identification of resources available to assist in meeting health care needs will improve  Description: Identification of resources available to assist in meeting health care needs will improve  Outcome: Met This Shift     Problem: Nutritional:  Goal: Ability to attain and maintain optimal nutritional status will improve  Description: Ability to attain and maintain optimal nutritional status will improve  Outcome: Met This Shift     Problem: Physical Regulation:  Goal: Will remain free from infection  Description: Will remain free from infection  Outcome: Met This Shift     Problem: Physical Regulation:  Goal: Postoperative complications will be avoided or minimized  Description: Postoperative complications will be avoided or minimized  9/8/2020 0923 by Isadora Madden RN  Outcome: Met This Shift     Problem: Physical Regulation:  Goal: Diagnostic test results will improve  Description: Diagnostic test results will improve  9/8/2020 0923 by Isadora Madden RN  Outcome: Met This Shift     Problem: Respiratory:  Goal: Ability to maintain adequate ventilation will improve  Description: Ability to maintain adequate ventilation will improve  Outcome: Met This Shift     Problem: Safety:  Goal: Ability to remain free from injury will improve  Description: Ability to remain free from injury will improve  9/8/2020 0923 by Liliana Zaldivar RN  Outcome: Met This Shift     Problem: Self-Care:  Goal: Ability to meet self-care needs will improve  Description: Ability to meet self-care needs will improve  9/8/2020 0923 by Liliana Zaldivar RN  Outcome: Met This Shift     Problem: Self-Concept:  Goal: Ability to maintain and perform role responsibilities to the fullest extent possible will improve  Description: Ability to maintain and perform role responsibilities to the fullest extent possible will improve  9/8/2020 0923 by Liliana Zaldivar RN  Outcome: Met This Shift     Problem: Self-Concept:  Goal: Verbalizations of decreased anxiety will increase  Description: Verbalizations of decreased anxiety will increase  Outcome: Met This Shift     Problem: Tissue Perfusion:  Goal: Peripheral tissue perfusion will improve  Description: Peripheral tissue perfusion will improve  Outcome: Met This Shift     Problem: Tissue Perfusion:  Goal: Risk of venous thrombosis will decrease  Description: Risk of venous thrombosis will decrease  Outcome: Met This Shift     Problem: Urinary Elimination:  Goal: Ability to reestablish a normal urinary elimination pattern will improve - after catheter removal  Description: Ability to reestablish a normal urinary elimination pattern will improve  Outcome: Met This Shift

## 2020-09-08 NOTE — PROGRESS NOTES
Report called to Shayy Schmidt on [address].     Electronically signed by Kailash Hicks RN on 9/8/2020 at 6:48 PM

## 2020-09-08 NOTE — CARE COORDINATION
Social Work discharge 1 [facility] following with CM for discharge planning. HENS done for  SNF. [place] forms with Envelope in pt's folder.   Electronically signed by Ashanti Belle on 9/8/2020 at 11:29 AM

## 2020-09-08 NOTE — PROGRESS NOTES
presence of abductor pillow in room. Will ask for clarification. Pt remained in chair at end of the session. Education/treatment:  ADL retraining with facilitation of movement to increase self care. Pt instructed with posterolateral hip precautions for ADL until further clarification. Therapeutic activity to address balance, strength, and endurance for ADL and transfers. Pt education of hip precautions, transfer safety, and  walker safety. · Pt has made  progress towards set goals.        Treatment Charges: Mins Units    ADL/Home Mgt 69782 16 1    Thera Activities 81716 10 1    Ther Ex 66226      Manual Therapy 85256      Neuro Re-ed 89025      OHMLPXDI manage/training  96392      Non Billable Time      Total Timed Treatment 26 1287 [facility] GLORIA/L [id number]

## 2020-09-08 NOTE — PROGRESS NOTES
Per pt. Request, notified her sister-in-law, Riri Ortiz, that she is being transferred to room 719.     Electronically signed by Kimberly Zaragoza RN on 9/8/2020 at 6:36 PM

## 2020-09-08 NOTE — PROGRESS NOTES
Pt. Voiding with no difficulty.     Electronically signed by Yvette Zafar RN on 9/8/2020 at 12:23 PM

## 2020-09-08 NOTE — PROGRESS NOTES
during gait progression. Once in the chair, LE exercise performed  Treatment: Pt practiced and was instructed in the following treatment: UE usage to assist with transfers, gait promoting posture, sequence and staying within [employer] base of support, exercise promoting circulation, ROM and strengthening, hip precautions    Pt was left in a bedside hip chair with call light in reach. Time in 0848   Time out 0911   Total Treatment Time 23 minutes   CPT codes:     Therapeutic activities 67159 10 minutes   Therapeutic exercises 83394 13 minutes       Pt is making good progress toward established Physical Therapy goals as per increased functional mobility performed and exercise participation. Continue with physical therapy current plan of care daily.     Ilan Whitney PTA   License Number: [id number]

## 2020-09-09 LAB
ANION GAP SERPL CALCULATED.3IONS-SCNC: 8 MMOL/L (ref 7–16)
BUN BLDV-MCNC: 11 MG/DL (ref 8–23)
CALCIUM SERPL-MCNC: 8.6 MG/DL (ref 8.6–10.2)
CHLORIDE BLD-SCNC: 99 MMOL/L (ref 98–107)
CO2: 26 MMOL/L (ref 22–29)
CREAT SERPL-MCNC: 0.6 MG/DL (ref 0.5–1)
GFR AFRICAN AMERICAN: >60
GFR NON-AFRICAN AMERICAN: >60 ML/MIN/1.73
GLUCOSE BLD-MCNC: 124 MG/DL (ref 74–99)
HCT VFR BLD CALC: 27.8 % (ref 34–48)
HEMOGLOBIN: 9.1 G/DL (ref 11.5–15.5)
MCH RBC QN AUTO: 29.1 PG (ref 26–35)
MCHC RBC AUTO-ENTMCNC: 32.7 % (ref 32–34.5)
MCV RBC AUTO: 88.8 FL (ref 80–99.9)
PDW BLD-RTO: 12.6 FL (ref 11.5–15)
PLATELET # BLD: 193 E9/L (ref 130–450)
PMV BLD AUTO: 9.8 FL (ref 7–12)
POTASSIUM REFLEX MAGNESIUM: 3.7 MMOL/L (ref 3.5–5)
RBC # BLD: 3.13 E12/L (ref 3.5–5.5)
SODIUM BLD-SCNC: 133 MMOL/L (ref 132–146)
WBC # BLD: 11.1 E9/L (ref 4.5–11.5)

## 2020-09-09 PROCEDURE — 36415 COLL VENOUS BLD VENIPUNCTURE: CPT

## 2020-09-09 PROCEDURE — 97530 THERAPEUTIC ACTIVITIES: CPT

## 2020-09-09 PROCEDURE — 1200000000 HC SEMI PRIVATE

## 2020-09-09 PROCEDURE — 6370000000 HC RX 637 (ALT 250 FOR IP): Performed by: GENERAL PRACTICE

## 2020-09-09 PROCEDURE — 97110 THERAPEUTIC EXERCISES: CPT

## 2020-09-09 PROCEDURE — 85027 COMPLETE CBC AUTOMATED: CPT

## 2020-09-09 PROCEDURE — 97535 SELF CARE MNGMENT TRAINING: CPT

## 2020-09-09 PROCEDURE — 80048 BASIC METABOLIC PNL TOTAL CA: CPT

## 2020-09-09 PROCEDURE — 6370000000 HC RX 637 (ALT 250 FOR IP): Performed by: ORTHOPAEDIC SURGERY

## 2020-09-09 RX ADMIN — DOCUSATE SODIUM 50 MG AND SENNOSIDES 8.6 MG 1 TABLET: 8.6; 5 TABLET, FILM COATED ORAL at 08:47

## 2020-09-09 RX ADMIN — ASPIRIN 81 MG: 81 TABLET, COATED ORAL at 20:42

## 2020-09-09 RX ADMIN — ATORVASTATIN CALCIUM 20 MG: 20 TABLET, FILM COATED ORAL at 08:47

## 2020-09-09 RX ADMIN — ASPIRIN 81 MG: 81 TABLET, COATED ORAL at 08:47

## 2020-09-09 ASSESSMENT — PAIN SCALES - GENERAL
PAINLEVEL_OUTOF10: 0

## 2020-09-09 NOTE — PROGRESS NOTES
[facility] Quality Flow/Interdisciplinary Rounds Progress Note        Quality Flow Rounds held on September 9, 2020    Disciplines Attending:  Bedside Nurse, ,  and Nursing Unit Leadership    Kristina Joshi was admitted on 9/5/2020  9:26 PM    Anticipated Discharge Date:  Expected Discharge Date: 09/08/20    Disposition:    Landon Score:  Landon Scale Score: 20    Readmission Risk              Risk of Unplanned Readmission:        11           Discussed patient goal for the day, patient clinical progression, and barriers to discharge.   The following Goal(s) of the Day/Commitment(s) have been identified:  Discharge Planning      [facility]  September 9, 2020

## 2020-09-09 NOTE — PROGRESS NOTES
Physical Therapy  Treatment note    Attending Provider:  Lynn Stanley DO    Evaluating PT:  George Casiano PT    Room #:  2638/4703-T  Diagnosis:  Closed fracture of neck of right femur  Pertinent PMHx/PSHx:  S/P RT Hip Manuel   Procedure/Surgery:  RT Hip Manuel 9/6/20  Precautions:  WBAT, [place] hip prec, falls,   Equipment Needs:  [facility],     SUBJECTIVE:    Pt lives with alone in a 1 story Apt with 0 stairs and 0 rail to enter; elevator to floor. Pt ambulated with No AD PTA. No prior falls reported. OBJECTIVE:   Initial Evaluation  Date: 9/7/2020 Treatment  9/9/2020 Short Term/ Long Term   Goals   Was pt agreeable to Eval/treatment? Yes yes    Does pt have pain? Min RT hip Minimal R hip pain    Bed Mobility  Rolling: CGA  Supine to sit: Min  Sit to supine: NA/hip chair transfer  Scooting: SBA/S Rolling: NT  Supine to sit: Min A  Sit to supine: Min A  Scooting: Min A to EOB SBA/CGA all levels    Transfers Sit to stand: Min/CGA  Stand to sit: SBA/S  Stand pivot: NA Sit to stand: CGA  Stand to sit: CGA  Stand Pivot: NT SBA/S all surfaces with good safety awareness   Ambulation   10 feet with [facility] Min A. See below 90 feet x 1 using WW for support CGA for balance 40+ feet with [facility] CGA/SBA   Stair negotiation: ascended and descended  NA     AM-PAC 6 Clicks 19/39 60/55        Pt is alert and able to follow instruction  Balance: fair minus dynamic using [facility] for support    Pt performed therapeutic exercise of the following in a hip chair: seated B ankle pumps, glut sets AROM; B LE LAQ's A/AAROM x 20: R LE heel slide motions within hip precautions, hip ABd AAROM 20 reps x 2    Patient education  Pt was educated on exercise, transfers and gait    Patient response to education:   Pt verbalized understanding Pt demonstrated skill Pt requires further education in this area   yes With prompt for transfer safety yes     ASSESSMENT:   Comments: Pt found in a bedside hip chair, exercise performed.  Gait to the hallway, rajwinder slow and inconsistent, step to/step through non patterns used intermittently. Pt unsteady throughout, required consistent light hands on assist for balance and safety. Pt remains unsafe to gait or transfer alone presently. Pt fatigued after activity. Treatment: Pt practiced and was instructed in the following treatment: UE usage to assist with transfers, gait promoting posture, sequence and staying within [employer] base of support, exercise promoting circulation, ROM and strengthening, hip precautions    Pt was left in a bedside hip chair as found with call light in reach. Time in 0757   Time out 0822   Total Treatment Time 25 minutes   CPT codes:     Therapeutic activities 58418 12 minutes   Therapeutic exercises 85529 13 minutes       Pt is making good progress toward established Physical Therapy goals as per functional mobility performed and exercise participation. Continue with physical therapy current plan of care daily.     Cristina Hernandez PTA   License Number: [id number]

## 2020-09-09 NOTE — PROGRESS NOTES
Occupational Therapy  OT BEDSIDE TREATMENT NOTE      Date:2020  Patient Name: Vaishnavi Gilmore  MRN: [id number]  : 1940  Room: [address] [place], [place] [address]     Referring Provider:  DO Shanae Butler OT: Vijay Baker OTR/L [id number]     AM-PAC Daily Activity Raw Score: 15     Recommended Adaptive Equipment: continue to assess      Diagnosis: femur fracture. Fall at home. S/p   HIP HEMIARTHROPLASTY (Right Hip) 2020  Pertinent Medical History: squaumous cell carcinoma    Precautions:  Falls, WBAT R LE,      Home Living: Pt lives alone, 1 floor aprtment with no steps. Tub/shower unit with grab bars  Prior Level of Function: Independent  with ADLs , Independent  with IADLs; ambulated no device      Pain Level: Pt did not rate pain during session. Cognition:  Alert and grossly oriented. Limited safety awareness. Functional Assessment:    Initial Eval Status  Date: 20 Tx Session  STGs = LTGs  1-2 weeks    Feeding Set-up        Grooming SBA/set-up ,seated   Decrease standing tolerance, balance and safety  SBA for balance while standing at the sink  Mod I    UB Dressing Min A  Don/doff hospital gown   Mod I    LB Dressing Max A  Max A over feet until hip precautions clarified.      Mod I    Bathing Max A    mod I    Toileting Assist with hygiene  Min A       Bed Mobility  Up in chair upon arrival  Pt sitting in chair upon therapist arrival       Functional Transfers Min A  Sit-stand from elevated chair  CGA and cues for hand placement   Mod I    Functional Mobility Mod A,w/walker   Steps next to chair   Cueing for tech, walker safety   Unsteadiness  CGA using w/w limited distance in room.   Mod I with good tolerance    Balance Sitting:     Static:  Supervision     Standing: Mod/Min A        Activity Tolerance Fair with light activity  Fair -   Good  with ADL activity       Comments: requesting clarification of hip precautions from nursing. Pt completed ADL activity in bathroom.   Limited safety awareness. Remained in chair at end of the session. Education/treatment:  ADL retraining with facilitation of movement to increase self care. Pt instructed with posterolateral hip precautions for ADL until further clarification. Therapeutic activity to address balance, strength, and endurance for ADL and transfers. Pt education of hip precautions, transfer safety, and  walker safety. · Pt has made  progress towards set goals.      Time in 8:57    Time out 9:20     Treatment Charges: Mins Units    ADL/Home Mgt 22550 14 1    Thera Activities 16516 9 1    Ther Ex 05799      Manual Therapy 75589      Neuro Re-ed 81747      Orthotic manage/training  93165      Non Billable Time      Total Timed Treatment 23 1287 [facility] GLORIA/L 24158

## 2020-09-09 NOTE — PLAN OF CARE
Problem: Falls - Risk of:  Goal: Will remain free from falls  Description: Will remain free from falls  Outcome: Met This Shift  Goal: Absence of physical injury  Description: Absence of physical injury  Outcome: Met This Shift     Problem:  Activity:  Goal: Ability to ambulate will improve  Description: Ability to ambulate will improve  Outcome: Met This Shift     Problem: Nutritional:  Goal: Ability to attain and maintain optimal nutritional status will improve  Description: Ability to attain and maintain optimal nutritional status will improve  Outcome: Met This Shift     Problem: Physical Regulation:  Goal: Will remain free from infection  Description: Will remain free from infection  Outcome: Met This Shift

## 2020-09-10 VITALS
OXYGEN SATURATION: 96 % | WEIGHT: 137 LBS | RESPIRATION RATE: 16 BRPM | HEART RATE: 75 BPM | HEIGHT: 63 IN | DIASTOLIC BLOOD PRESSURE: 59 MMHG | TEMPERATURE: 98.7 F | BODY MASS INDEX: 24.27 KG/M2 | SYSTOLIC BLOOD PRESSURE: 107 MMHG

## 2020-09-10 LAB
SARS-COV-2: NOT DETECTED
SOURCE: NORMAL

## 2020-09-10 PROCEDURE — 2580000003 HC RX 258: Performed by: ORTHOPAEDIC SURGERY

## 2020-09-10 PROCEDURE — 6370000000 HC RX 637 (ALT 250 FOR IP): Performed by: GENERAL PRACTICE

## 2020-09-10 PROCEDURE — 97530 THERAPEUTIC ACTIVITIES: CPT

## 2020-09-10 PROCEDURE — 97110 THERAPEUTIC EXERCISES: CPT

## 2020-09-10 PROCEDURE — 6370000000 HC RX 637 (ALT 250 FOR IP): Performed by: ORTHOPAEDIC SURGERY

## 2020-09-10 RX ADMIN — SODIUM CHLORIDE, PRESERVATIVE FREE 10 ML: 5 INJECTION INTRAVENOUS at 08:55

## 2020-09-10 RX ADMIN — ASPIRIN 81 MG: 81 TABLET, COATED ORAL at 08:55

## 2020-09-10 RX ADMIN — DOCUSATE SODIUM 50 MG AND SENNOSIDES 8.6 MG 1 TABLET: 8.6; 5 TABLET, FILM COATED ORAL at 08:55

## 2020-09-10 RX ADMIN — ATORVASTATIN CALCIUM 20 MG: 20 TABLET, FILM COATED ORAL at 08:55

## 2020-09-10 ASSESSMENT — PAIN SCALES - GENERAL
PAINLEVEL_OUTOF10: 0

## 2020-09-10 NOTE — PLAN OF CARE
Problem: Falls - Risk of:  Goal: Will remain free from falls  Description: Will remain free from falls  Outcome: Met This Shift  Goal: Absence of physical injury  Description: Absence of physical injury  Outcome: Met This Shift     Problem:  Activity:  Goal: Ability to ambulate will improve  Description: Ability to ambulate will improve  Outcome: Met This Shift     Problem: Physical Regulation:  Goal: Will remain free from infection  Description: Will remain free from infection  Outcome: Met This Shift     Problem: Respiratory:  Goal: Ability to maintain adequate ventilation will improve  Description: Ability to maintain adequate ventilation will improve  Outcome: Met This Shift     Problem: Safety:  Goal: Ability to remain free from injury will improve  Description: Ability to remain free from injury will improve  Outcome: Met This Shift

## 2020-09-10 NOTE — PROGRESS NOTES
Physical Therapy  Treatment note    Attending Provider:  Kiran Linares DO    Evaluating PT:  Juan Alcala PT    Room #:  5587/2632-X  Diagnosis:  Closed fracture of neck of right femur  Pertinent PMHx/PSHx:  S/P RT Hip Manuel   Procedure/Surgery:  RT Hip Manuel 9/6/20  Precautions:  WBAT, Weed hip prec, falls,   Equipment Needs:  [employer],     SUBJECTIVE:    Pt lives with alone in a 1 story Apt with 0 stairs and 0 rail to enter; elevator to floor. Pt ambulated with No AD PTA. No prior falls reported. OBJECTIVE:   Initial Evaluation  Date: 9/7/2020 Treatment  9/10/2020 Short Term/ Long Term   Goals   Was pt agreeable to Eval/treatment? Yes yes    Does pt have pain? Min RT hip Minimal R hip pain    Bed Mobility  Rolling: CGA  Supine to sit: Min  Sit to supine: NA/hip chair transfer  Scooting: SBA/S Rolling: NT  Supine to sit: NT  Sit to supine: NT  Scooting: NT SBA/CGA all levels    Transfers Sit to stand: Min/CGA  Stand to sit: SBA/S  Stand pivot: NA Sit to stand: CGA  Stand to sit: CGA  Stand Pivot: NT SBA/S all surfaces with good safety awareness   Ambulation   10 feet with [employer] Min A. See below 130 feet x 1 using WW for support CGA for balance 40+ feet with [employer] CGA/SBA   Stair negotiation: ascended and descended  NA     AM-PAC 6 Clicks 80/29 40/74        Pt is alert and able to follow instruction  Balance: fair minus dynamic using [employer] for support    Pt performed therapeutic exercise of the following in a hip chair: seated B ankle pumps, glut sets AROM; B LE LAQ's A/AAROM x 20: R LE heel slide motions within hip precautions, hip ABd AAROM 20 reps x 2    Patient education  Pt was educated on exercise, transfers and gait    Patient response to education:   Pt verbalized understanding Pt demonstrated skill Pt requires further education in this area   yes With prompt for transfer safety yes     ASSESSMENT:   Comments: Pt found in a bedside hip chair.  Gait to the hallway, rajwinder slow and inconsistent, step to/step through non patterns used intermittently. Pt remains unsteady throughout, required consistent light hands on assist for balance and safety. Pt remains unsafe to gait or transfer alone presently. Exercise performed once Pt seated in the chair. Treatment: Pt practiced and was instructed in the following treatment: UE usage to assist with transfers, gait promoting posture, sequence and staying within [employer] base of support, exercise promoting circulation, ROM and strengthening, hip precautions preventing Pt to cross her legs    Pt was left in a bedside hip chair as found with call light in reach. Time in 0903   Time out 0926   Total Treatment Time 23 minutes   CPT codes:     Therapeutic activities 28528 12 minutes   Therapeutic exercises 34886 11 minutes       Pt is making good progress toward established Physical Therapy goals as per increased functional mobility performed and exercise participation. Continue with physical therapy current plan of care daily.     Chalino Carrington [facility]   License Number: [id number]

## 2020-09-10 NOTE — PLAN OF CARE
Problem: Falls - Risk of:  Goal: Will remain free from falls  Description: Will remain free from falls  9/10/2020 0933 by Dean Patel RN  Outcome: Met This Shift  9/10/2020 0052 by Brady Bender  Outcome: Met This Shift     Problem: Falls - Risk of:  Goal: Absence of physical injury  Description: Absence of physical injury  9/10/2020 0052 by Brady Bender  Outcome: Met This Shift     Problem: Physical Regulation:  Goal: Will remain free from infection  Description: Will remain free from infection  9/10/2020 0052 by Brady Bender  Outcome: Met This Shift

## 2020-09-25 ENCOUNTER — OFFICE VISIT (OUTPATIENT)
Dept: ORTHOPEDIC SURGERY | Age: 80
End: 2020-09-25
Payer: COMMERCIAL

## 2020-09-25 ENCOUNTER — HOSPITAL ENCOUNTER (OUTPATIENT)
Dept: GENERAL RADIOLOGY | Age: 80
Discharge: HOME | End: 2020-09-27
Payer: COMMERCIAL

## 2020-09-25 VITALS — HEART RATE: 67 BPM | SYSTOLIC BLOOD PRESSURE: 97 MMHG | TEMPERATURE: 98.8 F | DIASTOLIC BLOOD PRESSURE: 51 MMHG

## 2020-09-25 PROBLEM — Z96.649 STATUS POST HIP HEMIARTHROPLASTY: Status: ACTIVE | Noted: 2020-09-25

## 2020-09-25 PROCEDURE — 99212 OFFICE O/P EST SF 10 MIN: CPT

## 2020-09-25 PROCEDURE — 73502 X-RAY EXAM HIP UNI 2-3 VIEWS: CPT

## 2020-09-25 PROCEDURE — 99024 POSTOP FOLLOW-UP VISIT: CPT | Performed by: PHYSICIAN ASSISTANT

## 2020-09-25 RX ORDER — DOCUSATE SODIUM 100 MG/1
100 CAPSULE, LIQUID FILLED ORAL 2 TIMES DAILY
COMMUNITY

## 2020-09-25 RX ORDER — HYDROCODONE BITARTRATE AND ACETAMINOPHEN 5; 325 MG/1; MG/1
1 TABLET ORAL EVERY 6 HOURS PRN
COMMUNITY

## 2020-09-25 NOTE — PROGRESS NOTES
OP: SURGEON: Dr. Pravin Luz MD  DATE OF PROCEDURE: 9/6/2020  PROCEDURE: Right hip hemiarthroplasty for femoral neck fracture    Subjective:  Lvi Parker is approximately 2 weeks follow-up from the above surgery. Patient is WBAT on that extremity. She ambulates with assistive device, walker. Pain to extremity is none and is  taking pain medication, Acetaminophen per facility physician. They denies paresthesias. Does state she has some ankle and foot edema to the right lower extremity. Patient continues to use DVT prophylaxis, ASA 81 mg BID. Patient is  participating in therapy for ROM and strengthening of the right lower extremity at the [facility] nursing facility. Denies calf pain, CP, SOB, fever, chills. Review of Systems -    General ROS: negative for - chills, fatigue, fever or night sweats  Respiratory ROS: no cough, shortness of breath, or wheezing  Cardiovascular ROS: no chest pain or dyspnea on exertion  Gastrointestinal ROS: no abdominal pain, nausea, vomiting, diarrhea, constipation,or black or bloody stools  Genitourinary: no hematuria, dysuria, or incontinence   Musculoskeletal ROS: negative for -back or neck pain or stiffness, also see HPI  Neurological ROS: no TIA or stroke symptoms       Objective:    General: Alert and oriented X 3, normocephalic atraumatic, external ears and eye normal, sclera clear, no acute distress, respirations easy and unlabored with no audible wheezes, skin warm and dry, speech and dress appropriate for noted age, affect euthymic. Extremity:  Right Lower Extremity  Skin clean dry and intact, without signs of infection  Incisions well approximated without signs of redness, warmth or drainage- staples intact  Mild edema noted about the R hip  Compartments supple throughout thigh and leg  Calf supple and nontender  Demonstrates active Hip ROM, knee flexion/extension, ankle plantar/dorsiflexion/great toe extension.    nontender about the R hip  States sensation intact to touch in sural/deep peroneal/superficial peroneal/saphenous/posterior tibial nerve distributions to foot/ankle. Palpable dorsalis pedis and posterior tibialis pulses, cap refill brisk in toes, foot warm/perfused. BP (!) 97/51   Pulse 67   Temp 98.8 °F (37.1 °C)     XR:   Multiple views of right hip and pelvis pain today demonstrate right hip hemiarthroplasty in good alignment without subsidence or failure. No acute fractures or dislocations noted. Assessment:   Diagnosis Orders   1. Closed displaced fracture of right femoral neck (HCC)  XR HIP 2-3 VW W PELVIS RIGHT   2. Status post hip hemiarthroplasty         Plan:   Reviewed x-rays with patient today in office   Patient should be weightbearing as tolerated bilateral lower extremities. Use walker for support. Continue PT/OT for strengthening, range of motion, balance and proprioception, gait training, modalities as needed. Pain control per facility physician. Recommend ice, elevation, compression with Ace wrap for lower extremity edema, continues of acetaminophen. Staples removed today. Steri-Strips placed. Can shower normally, but no scrubbing incision line until skin is fully healed. If Steri-Strips fall off on their own, that is okay. Can remove Steri-Strips in 1 to 2 weeks. No bathing or submerging incision line until skin is fully healed. Once skin is fully healed, can manually perform scar massage with lotion to break up any adhesions. Continue aspirin 81 mg twice daily for the duration of the prescription for clot prevention. Follow-up in office in 4 weeks. We will obtain new x-rays at that time. Please call with any questions or concerns. [phone]. Follow up in 4 weeks with XR of the L hip and pelvis    Electronically signed by Maryanne Farr PA-C on 9/25/2020 at 1:27 PM  Note: This report was completed using [employer] voiced recognition software.   Every effort has been made to ensure accuracy; however, inadvertent computerized transcription errors may be present.

## 2020-10-26 ENCOUNTER — HOSPITAL ENCOUNTER (OUTPATIENT)
Dept: GENERAL RADIOLOGY | Age: 80
Discharge: HOME | End: 2020-10-28
Payer: COMMERCIAL

## 2020-10-26 ENCOUNTER — HOSPITAL ENCOUNTER (OUTPATIENT)
Age: 80
Discharge: HOME | End: 2020-10-28
Payer: COMMERCIAL

## 2020-10-26 PROCEDURE — 73502 X-RAY EXAM HIP UNI 2-3 VIEWS: CPT

## 2022-10-13 ENCOUNTER — HOSPITAL ENCOUNTER (OUTPATIENT)
Age: 82
Discharge: HOME | End: 2022-10-13
Payer: COMMERCIAL

## 2022-10-13 LAB
ALBUMIN SERPL-MCNC: 3.9 G/DL (ref 3.5–5.2)
ALP BLD-CCNC: 72 U/L (ref 35–104)
ALT SERPL-CCNC: 100 U/L (ref 0–32)
AMYLASE: 116 U/L (ref 20–100)
AST SERPL-CCNC: 116 U/L (ref 0–31)
BILIRUB SERPL-MCNC: 0.3 MG/DL (ref 0–1.2)
BILIRUBIN DIRECT: <0.2 MG/DL (ref 0–0.3)
BILIRUBIN, INDIRECT: ABNORMAL MG/DL (ref 0–1)
LIPASE: 144 U/L (ref 13–60)
TOTAL PROTEIN: 6.6 G/DL (ref 6.4–8.3)

## 2022-10-13 PROCEDURE — 80076 HEPATIC FUNCTION PANEL: CPT

## 2022-10-13 PROCEDURE — 82150 ASSAY OF AMYLASE: CPT

## 2022-10-13 PROCEDURE — 36415 COLL VENOUS BLD VENIPUNCTURE: CPT

## 2022-10-13 PROCEDURE — 83690 ASSAY OF LIPASE: CPT

## 2022-10-13 PROCEDURE — 86376 MICROSOMAL ANTIBODY EACH: CPT

## 2022-10-13 PROCEDURE — 80074 ACUTE HEPATITIS PANEL: CPT

## 2022-10-14 LAB
HAV IGM SER IA-ACNC: NORMAL
HEPATITIS B CORE IGM ANTIBODY: NORMAL
HEPATITIS B SURFACE ANTIGEN INTERPRETATION: NORMAL
HEPATITIS C ANTIBODY INTERPRETATION: NORMAL

## 2022-10-17 LAB — LIVER-KIDNEY MICROSOME-1 AB IGG: 1.3 U (ref 0–24.9)

## 2024-04-01 ENCOUNTER — HOSPITAL ENCOUNTER (INPATIENT)
Age: 84
LOS: 6 days | Discharge: NURSING, REHAB OR LONG-TERM CARE | DRG: 389 | End: 2024-04-08
Attending: EMERGENCY MEDICINE | Admitting: GENERAL PRACTICE
Payer: COMMERCIAL

## 2024-04-01 DIAGNOSIS — R11.2 NAUSEA AND VOMITING, UNSPECIFIED VOMITING TYPE: ICD-10-CM

## 2024-04-01 DIAGNOSIS — R79.89 ELEVATED TROPONIN: Primary | ICD-10-CM

## 2024-04-01 LAB
BASOPHILS # BLD: 0.07 K/UL (ref 0–0.2)
BASOPHILS NFR BLD: 1 % (ref 0–2)
EOSINOPHIL # BLD: 0.11 K/UL (ref 0.05–0.5)
EOSINOPHILS RELATIVE PERCENT: 1 % (ref 0–6)
ERYTHROCYTE [DISTWIDTH] IN BLOOD BY AUTOMATED COUNT: 13.8 % (ref 11.5–15)
HCT VFR BLD AUTO: 38.3 % (ref 34–48)
HGB BLD-MCNC: 11.7 G/DL (ref 11.5–15.5)
IMM GRANULOCYTES # BLD AUTO: 0.03 K/UL (ref 0–0.58)
IMM GRANULOCYTES NFR BLD: 0 % (ref 0–5)
LYMPHOCYTES NFR BLD: 2.05 K/UL (ref 1.5–4)
LYMPHOCYTES RELATIVE PERCENT: 20 % (ref 20–42)
MCH RBC QN AUTO: 28 PG (ref 26–35)
MCHC RBC AUTO-ENTMCNC: 30.5 G/DL (ref 32–34.5)
MCV RBC AUTO: 91.6 FL (ref 80–99.9)
MONOCYTES NFR BLD: 0.82 K/UL (ref 0.1–0.95)
MONOCYTES NFR BLD: 8 % (ref 2–12)
NEUTROPHILS NFR BLD: 70 % (ref 43–80)
NEUTS SEG NFR BLD: 7.3 K/UL (ref 1.8–7.3)
PLATELET # BLD AUTO: 300 K/UL (ref 130–450)
PMV BLD AUTO: 10.4 FL (ref 7–12)
RBC # BLD AUTO: 4.18 M/UL (ref 3.5–5.5)
WBC OTHER # BLD: 10.4 K/UL (ref 4.5–11.5)

## 2024-04-01 PROCEDURE — 6360000002 HC RX W HCPCS

## 2024-04-01 PROCEDURE — 96374 THER/PROPH/DIAG INJ IV PUSH: CPT

## 2024-04-01 PROCEDURE — 99285 EMERGENCY DEPT VISIT HI MDM: CPT

## 2024-04-01 PROCEDURE — 85025 COMPLETE CBC W/AUTO DIFF WBC: CPT

## 2024-04-01 PROCEDURE — 83690 ASSAY OF LIPASE: CPT

## 2024-04-01 PROCEDURE — 80053 COMPREHEN METABOLIC PANEL: CPT

## 2024-04-01 PROCEDURE — 2580000003 HC RX 258

## 2024-04-01 PROCEDURE — 84484 ASSAY OF TROPONIN QUANT: CPT

## 2024-04-01 PROCEDURE — 83605 ASSAY OF LACTIC ACID: CPT

## 2024-04-01 PROCEDURE — 81001 URINALYSIS AUTO W/SCOPE: CPT

## 2024-04-01 RX ORDER — 0.9 % SODIUM CHLORIDE 0.9 %
1000 INTRAVENOUS SOLUTION INTRAVENOUS ONCE
Status: COMPLETED | OUTPATIENT
Start: 2024-04-01 | End: 2024-04-02

## 2024-04-01 RX ORDER — ONDANSETRON 2 MG/ML
4 INJECTION INTRAMUSCULAR; INTRAVENOUS ONCE
Status: COMPLETED | OUTPATIENT
Start: 2024-04-01 | End: 2024-04-01

## 2024-04-01 RX ADMIN — ONDANSETRON 4 MG: 2 INJECTION INTRAMUSCULAR; INTRAVENOUS at 23:57

## 2024-04-01 RX ADMIN — SODIUM CHLORIDE 1000 ML: 9 INJECTION, SOLUTION INTRAVENOUS at 23:57

## 2024-04-01 ASSESSMENT — LIFESTYLE VARIABLES
HOW OFTEN DO YOU HAVE A DRINK CONTAINING ALCOHOL: NEVER
HOW MANY STANDARD DRINKS CONTAINING ALCOHOL DO YOU HAVE ON A TYPICAL DAY: PATIENT DOES NOT DRINK

## 2024-04-02 ENCOUNTER — APPOINTMENT (OUTPATIENT)
Dept: GENERAL RADIOLOGY | Age: 84
DRG: 389 | End: 2024-04-02
Payer: COMMERCIAL

## 2024-04-02 ENCOUNTER — APPOINTMENT (OUTPATIENT)
Dept: CT IMAGING | Age: 84
DRG: 389 | End: 2024-04-02
Payer: COMMERCIAL

## 2024-04-02 PROBLEM — R79.89 ELEVATED TROPONIN: Status: ACTIVE | Noted: 2024-04-02

## 2024-04-02 LAB
ALBUMIN SERPL-MCNC: 4.2 G/DL (ref 3.5–5.2)
ALP SERPL-CCNC: 82 U/L (ref 35–104)
ALT SERPL-CCNC: 58 U/L (ref 0–32)
ANION GAP SERPL CALCULATED.3IONS-SCNC: 8 MMOL/L (ref 7–16)
AST SERPL-CCNC: 71 U/L (ref 0–31)
BILIRUB SERPL-MCNC: <0.2 MG/DL (ref 0–1.2)
BILIRUB UR QL STRIP: NEGATIVE
BNP SERPL-MCNC: 1477 PG/ML (ref 0–450)
BUN SERPL-MCNC: 22 MG/DL (ref 6–23)
CALCIUM SERPL-MCNC: 9.4 MG/DL (ref 8.6–10.2)
CHLORIDE SERPL-SCNC: 98 MMOL/L (ref 98–107)
CLARITY UR: CLEAR
CO2 SERPL-SCNC: 30 MMOL/L (ref 22–29)
COLOR UR: YELLOW
CREAT SERPL-MCNC: 0.5 MG/DL (ref 0.5–1)
EKG ATRIAL RATE: 87 BPM
EKG P AXIS: 58 DEGREES
EKG P-R INTERVAL: 190 MS
EKG Q-T INTERVAL: 386 MS
EKG QRS DURATION: 96 MS
EKG QTC CALCULATION (BAZETT): 464 MS
EKG R AXIS: 35 DEGREES
EKG T AXIS: 42 DEGREES
EKG VENTRICULAR RATE: 87 BPM
GFR SERPL CREATININE-BSD FRML MDRD: >90 ML/MIN/1.73M2
GLUCOSE SERPL-MCNC: 98 MG/DL (ref 74–99)
GLUCOSE UR STRIP-MCNC: NEGATIVE MG/DL
HGB UR QL STRIP.AUTO: ABNORMAL
KETONES UR STRIP-MCNC: NEGATIVE MG/DL
LACTATE BLDV-SCNC: 1.1 MMOL/L (ref 0.5–2.2)
LEUKOCYTE ESTERASE UR QL STRIP: NEGATIVE
LIPASE SERPL-CCNC: 50 U/L (ref 13–60)
NITRITE UR QL STRIP: NEGATIVE
PH UR STRIP: 7.5 [PH] (ref 5–9)
POTASSIUM SERPL-SCNC: 4.4 MMOL/L (ref 3.5–5)
PROT SERPL-MCNC: 7 G/DL (ref 6.4–8.3)
PROT UR STRIP-MCNC: NEGATIVE MG/DL
RBC #/AREA URNS HPF: ABNORMAL /HPF
SODIUM SERPL-SCNC: 136 MMOL/L (ref 132–146)
SP GR UR STRIP: 1.01 (ref 1–1.03)
TROPONIN I SERPL HS-MCNC: 610 NG/L (ref 0–9)
TROPONIN I SERPL HS-MCNC: 733 NG/L (ref 0–9)
UROBILINOGEN UR STRIP-ACNC: 0.2 EU/DL (ref 0–1)
WBC #/AREA URNS HPF: ABNORMAL /HPF

## 2024-04-02 PROCEDURE — 71045 X-RAY EXAM CHEST 1 VIEW: CPT

## 2024-04-02 PROCEDURE — 84484 ASSAY OF TROPONIN QUANT: CPT

## 2024-04-02 PROCEDURE — 2060000000 HC ICU INTERMEDIATE R&B

## 2024-04-02 PROCEDURE — 93010 ELECTROCARDIOGRAM REPORT: CPT | Performed by: INTERNAL MEDICINE

## 2024-04-02 PROCEDURE — 93005 ELECTROCARDIOGRAM TRACING: CPT

## 2024-04-02 PROCEDURE — 6360000004 HC RX CONTRAST MEDICATION: Performed by: RADIOLOGY

## 2024-04-02 PROCEDURE — 74177 CT ABD & PELVIS W/CONTRAST: CPT

## 2024-04-02 PROCEDURE — 83880 ASSAY OF NATRIURETIC PEPTIDE: CPT

## 2024-04-02 RX ADMIN — IOPAMIDOL 75 ML: 755 INJECTION, SOLUTION INTRAVENOUS at 01:30

## 2024-04-02 ASSESSMENT — PAIN SCALES - GENERAL: PAINLEVEL_OUTOF10: 0

## 2024-04-02 NOTE — ED NOTES
ED to Inpatient Handoff Report    Notified Rosalia that electronic handoff available and patient ready for transport to room 631.    Safety Risks: Risk of falls    Patient in Restraints: no    Constant Observer or Patient : no    Telemetry Monitoring Ordered: Yes          Order to transfer to unit without monitor: NO    Last MEWS: 1 Time completed: 1406    Deterioration Index: 24.74    Vitals:    04/02/24 0713 04/02/24 0913 04/02/24 1212 04/02/24 1402   BP: (!) 90/31 (!) 124/54 105/78 96/60   Pulse: 60 75 75 68   Resp: 13 10  16   Temp:    97.7 °F (36.5 °C)   TempSrc:    Oral   SpO2: 95% 96% 97% 92%   Weight:       Height:           Opportunity for questions and clarification was provided.

## 2024-04-02 NOTE — ED PROVIDER NOTES
[facility] EMERGENCY DEPARTMENT  EMERGENCY DEPARTMENT ENCOUNTER        Pt Name: Haleigh Stacy  MRN: [id number]  Birthdate 1940  Date of evaluation: 4/1/2024  Provider: Issac Loera DO  PCP: Diogo Coyne DO  Note Started: 11:53 PM EDT 4/1/24    CHIEF COMPLAINT       Chief Complaint   Patient presents with    Emesis       HISTORY OF PRESENT ILLNESS: 1 or more Elements   History From: patient    Limitations to history : None    Haleigh Stacy is a 83 y.o. female who presents to the emergency department for 3 episodes of vomiting that started this evening after she ate dinner.  She reports that she had a TV dinner with steak and green beans, had 3 episodes of emesis, and called the ambulance for assistance.  She reports some mild generalized abdominal discomfort but no focal pain.  Mild nausea at time of interview. Patient denies fever, chills, headache, shortness of breath, chest pain, diarrhea, dysuria, hematuria, hematochezia, and melena.    Nursing Notes were all reviewed and agreed with or any disagreements were addressed in the HPI.        REVIEW OF SYSTEMS :           Positives and Pertinent negatives as per HPI.     SURGICAL HISTORY     Past Surgical History:   Procedure Laterality Date    BREAST SURGERY      pt unknown what breast lump removed    HIP SURGERY Right 9/6/2020    HIP HEMIARTHROPLASTY performed by Souleymane Olmedo MD at [facility] OR    HYSTERECTOMY  2005    pt unsure of med hx    SKIN BIOPSY         CURRENTMEDICATIONS       Previous Medications    ASPIRIN EC 81 MG EC TABLET    Take 1 tablet by mouth 2 times daily for 28 days    DOCUSATE SODIUM (COLACE) 100 MG CAPSULE    Take 100 mg by mouth 2 times daily For constipation    HYDROCODONE-ACETAMINOPHEN (NORCO) 5-325 MG PER TABLET    Take 1 tablet by mouth every 6 hours as needed for Pain (1 by mouth for pain). Non-pharmacological intervention used prior to administering medication in facility.  1-cold,  discussed)  IP CONSULT TO INTERNAL MEDICINE        I am the Primary Clinician of Record.    FINAL IMPRESSION      1. Nausea and vomiting, unspecified vomiting type    2. Elevated troponin          DISPOSITION/PLAN     DISPOSITION Admitted 04/02/2024 05:16:18 AM    DISPOSITION  Disposition: Admit to telemetry  Patient condition is serious    4/1/24, 11:53 PM EDT.    Issac Loera PGY-2  Emergency Medicine    PATIENT REFERRED TO:  No follow-up provider specified.    DISCHARGE MEDICATIONS:  New Prescriptions    No medications on file       DISCONTINUED MEDICATIONS:  Discontinued Medications    No medications on file              (Please note that portions of this note were completed with a voice recognition program.  Efforts were made to edit the dictations but occasionally words are mis-transcribed.)    Issac Loera DO (electronically signed)

## 2024-04-02 NOTE — PROGRESS NOTES
Spoke with Dr Reyes re new consult. Case discussed. Will see pt in the AM. If pt develops any issues overnight please reach back out to Dr Reyes via telephone.

## 2024-04-02 NOTE — PROGRESS NOTES
4 Eyes Skin Assessment     NAME:  Haleigh Stacy  YOB: 1940  MEDICAL RECORD NUMBER:  [id number]    The patient is being assessed for  Admission    I agree that at least one RN has performed a thorough Head to Toe Skin Assessment on the patient. ALL assessment sites listed below have been assessed.      Areas assessed by both nurses:    Head, Face, Ears, Shoulders, Back, Chest, Arms, Elbows, Hands, Sacrum. Buttock, Coccyx, Ischium, Legs. Feet and Heels, and Under Medical Devices         Does the Patient have a Wound? Yes wound(s) were present on assessment. LDA wound assessment was Initiated and completed by RN       Landon Prevention initiated by RN: No  Wound Care Orders initiated by RN: No    Pressure Injury (Stage 3,4, Unstageable, DTI, NWPT, and Complex wounds) if present, place Wound referral order by RN under : No    New Ostomies, if present place, Ostomy referral order under : No     Nurse 1 eSignature: Electronically signed by Carlos Alberto Keith RN on 4/2/24 at 5:37 PM EDT    **SHARE this note so that the co-signing nurse can place an eSignature**    Nurse 2 eSignature: Electronically signed by Tawanna Davies RN on 4/2/24 at 6:28 PM EDT

## 2024-04-02 NOTE — PROGRESS NOTES
Database initiated pharmacy verified with the patient. She is A&O comes in from home alone. She ambulates with a walker and is RA at baseline. She takes no medications at this time.

## 2024-04-03 ENCOUNTER — APPOINTMENT (OUTPATIENT)
Dept: CT IMAGING | Age: 84
DRG: 389 | End: 2024-04-03
Payer: COMMERCIAL

## 2024-04-03 LAB
CK SERPL-CCNC: 1881 U/L (ref 20–180)
TROPONIN I SERPL HS-MCNC: 895 NG/L (ref 0–9)

## 2024-04-03 PROCEDURE — 84484 ASSAY OF TROPONIN QUANT: CPT

## 2024-04-03 PROCEDURE — 82550 ASSAY OF CK (CPK): CPT

## 2024-04-03 PROCEDURE — 2060000000 HC ICU INTERMEDIATE R&B

## 2024-04-03 NOTE — PROGRESS NOTES
Patient noted to have a couple episodes of emesis that are dark ashwin red in color. Gen surg notified.    Patient educated on importance of completing CT with contrast and how it could help determine the cause of her symptoms. Patient still adamantly refusing scan.

## 2024-04-03 NOTE — PROGRESS NOTES
Patient is refusing CT scan. Education provided. Patient states she feels better, doesn't think she wants to get the scan done, and just wants to go home. Gen surg resident notified

## 2024-04-03 NOTE — PROGRESS NOTES
Consult completed to General Surgery via perfect serve text message    Rosalia Arizmendi - unit secretary

## 2024-04-03 NOTE — PLAN OF CARE
Problem: Discharge Planning  Goal: Discharge to home or other facility with appropriate resources  4/2/2024 2222 by Faye Degroot, RN  Outcome: Progressing  4/2/2024 1607 by Carlos Alberto Keith, RN  Outcome: Progressing

## 2024-04-03 NOTE — PLAN OF CARE
Problem: ABCDS Injury Assessment  Goal: Absence of physical injury  Outcome: Progressing     Problem: Discharge Planning  Goal: Discharge to home or other facility with appropriate resources  Outcome: Progressing     Problem: Skin/Tissue Integrity  Goal: Absence of new skin breakdown  Description: 1.  Monitor for areas of redness and/or skin breakdown  2.  Assess vascular access sites hourly  3.  Every 4-6 hours minimum:  Change oxygen saturation probe site  4.  Every 4-6 hours:  If on nasal continuous positive airway pressure, respiratory therapy assess nares and determine need for appliance change or resting period.  Outcome: Progressing     Problem: Pain  Goal: Verbalizes/displays adequate comfort level or baseline comfort level  Outcome: Progressing

## 2024-04-03 NOTE — CARE COORDINATION
Social Work / Discharge Planning : SW met with patient and explained role as discharge planner/ transition of care. Goals at discharge discussed: SNF / Home needs. Patient verified plan at discharge is HOME where she resides in an apartment alone. Patient denies any HOME needs and states her brother or sister will transport home when ready. Patient uses  a ww. Patient has a hx at [facility]. Patient PCP is DR Coyne and [facility] HHC cannot be set up due to current bed bug issues. Patient states apartment complex aware of matter. Therapy is ordered. Patient on RA. SW to follow. Electronically signed by JEANMARIE Thompson on 4/3/24 at 1:56 PM EDT

## 2024-04-03 NOTE — PROGRESS NOTES
[facility] Quality Flow/Interdisciplinary Rounds Progress Note        Quality Flow Rounds held on April 3, 2024    Disciplines Attending:  Bedside Nurse, , , and Nursing Unit Leadership    Haleigh Stacy was admitted on 4/1/2024 10:08 PM    Anticipated Discharge Date:       Disposition:    Landon Score:  Landon Scale Score: 21    Readmission Risk              Risk of Unplanned Readmission:  8           Discussed patient goal for the day, patient clinical progression, and barriers to discharge.  The following Goal(s) of the Day/Commitment(s) have been identified:   cardio eval, GS eval, monitor labs, control pain and nausea       Betsy Yeung RN  April 3, 2024

## 2024-04-03 NOTE — CONSULTS
CARDIOLOGY CONSULTATION    Patient Name:  Haleigh Stacy    :  1940    Reason for Consultation:   Elevated troponin    History of Present Illness:   Haleigh Stacy presents to [facility] following history of intractable nausea and emesis over the past 24 hours.  She was brought to the emergency room for further evaluation and found to have a significantly elevated troponin but without significant change in intensity.  She denied any chest discomfort except when experiencing emesis.  She has no previous history of coronary artery disease.  She does however have a history of hyperlipidemia.  She is a non-smoker and to the best of her knowledge has no significant family history of cardiac disease.  She simply feels weak.  Of note is that her EKG does not demonstrate any subtle nor significant ST-T wave changes but does demonstrate ventricular ectopy.    Past Medical History:   has a past medical history of Hyperlipidemia and SCC (squamous cell carcinoma).    Surgical History:   has a past surgical history that includes Breast surgery; Hysterectomy (); skin biopsy; and hip surgery (Right, 2020).     Social History:   reports that she has never smoked. She has never used smokeless tobacco. She reports that she does not drink alcohol and does not use drugs.     Family History:  family history includes Cancer in her father.     Medications:  Prior to Admission medications    Not on File       Allergies:  Patient has no known allergies.     Review of Systems:   Constitutional: there has been no unanticipated weight loss. There's been no significant change in energy or activity level, nor sleep pattern . No fever chills or rigors.    Eyes: No visual changes or diplopia. No scleral icterus.  ENT: No Headaches, hearing loss or vertigo. No mouth sores or sore throat. No change in taste or smell.  Cardiovascular: No chest discomfort, dyspnea on exertion, palpitations despite underlying

## 2024-04-03 NOTE — CONSULTS
GENERAL SURGERY  CONSULT NOTE    Patient's Name/Date of Birth: Haleigh Stacy / 1940    Date: April 3, 2024     PCP: Diogo Coyne DO     Chief Complaint:   Chief Complaint   Patient presents with    Emesis       Physician Consulted: Dr. Nuñez  Reason for Consult: pSBO  Referring Physician: Dr. Stanford MANUEL  Haleigh Stacy is a 83 y.o. female who presents for evaluation of nausea.  Patient states that she had 3 episodes of nausea few days ago.  She states this is currently resolved.  She is tolerating diet and having bowel movements per patient and per nursing.  Patient denies any more nausea, abdominal pain.  Denies any hematuria, hematochezia, and melena.  Is hoping to eat lunch and is wanting to return to her nursing facility. Surgically significant for hysterectomy. CT scan with some dilated segments of small bowel, which could be secondary to ileus versus a developing small bowel obstruction.  General surgery was consulted for the above.      Past Medical History:   Diagnosis Date    Hyperlipidemia     SCC (squamous cell carcinoma) 3/7/2011       Past Surgical History:   Procedure Laterality Date    BREAST SURGERY      pt unknown what breast lump removed    HIP SURGERY Right 9/6/2020    HIP HEMIARTHROPLASTY performed by Souleymane Olmedo MD at [facility] OR    HYSTERECTOMY  2005    pt unsure of med hx    SKIN BIOPSY         Medications Prior to Admission:    Prior to Admission medications    Not on File       No Known Allergies    Family History   Problem Relation Age of Onset    Cancer Father     ; No family history of problems with anesthesia     Social History     Tobacco Use    Smoking status: Never    Smokeless tobacco: Never   Substance Use Topics    Alcohol use: No    Drug use: No         Review of Systems   Review of Systems   All other systems reviewed and are negative.        PHYSICAL EXAM:    Vitals:    04/03/24 1106   BP: (!) 116/53   Pulse: 65   Resp: 18   Temp: 98 °F (36.7 °C)   SpO2:

## 2024-04-03 NOTE — PROGRESS NOTES
Heels noted to be red (blanchable) and boggy. Patient encouraged to wear heel protectors, patient refusing.

## 2024-04-04 ENCOUNTER — APPOINTMENT (OUTPATIENT)
Dept: GENERAL RADIOLOGY | Age: 84
DRG: 389 | End: 2024-04-04
Payer: COMMERCIAL

## 2024-04-04 ENCOUNTER — APPOINTMENT (OUTPATIENT)
Age: 84
DRG: 389 | End: 2024-04-04
Attending: INTERNAL MEDICINE
Payer: COMMERCIAL

## 2024-04-04 ENCOUNTER — APPOINTMENT (OUTPATIENT)
Dept: CT IMAGING | Age: 84
DRG: 389 | End: 2024-04-04
Payer: COMMERCIAL

## 2024-04-04 PROBLEM — R11.2 NAUSEA AND VOMITING: Status: ACTIVE | Noted: 2024-04-04

## 2024-04-04 PROBLEM — K56.609 SBO (SMALL BOWEL OBSTRUCTION) (HCC): Status: ACTIVE | Noted: 2024-04-04

## 2024-04-04 LAB
ALBUMIN SERPL-MCNC: 3.9 G/DL (ref 3.5–5.2)
ALP SERPL-CCNC: 72 U/L (ref 35–104)
ALT SERPL-CCNC: 47 U/L (ref 0–32)
ANION GAP SERPL CALCULATED.3IONS-SCNC: 7 MMOL/L (ref 7–16)
AST SERPL-CCNC: 65 U/L (ref 0–31)
BASOPHILS # BLD: 0.06 K/UL (ref 0–0.2)
BASOPHILS NFR BLD: 1 % (ref 0–2)
BILIRUB SERPL-MCNC: 0.2 MG/DL (ref 0–1.2)
BUN SERPL-MCNC: 13 MG/DL (ref 6–23)
CALCIUM SERPL-MCNC: 8.9 MG/DL (ref 8.6–10.2)
CHLORIDE SERPL-SCNC: 100 MMOL/L (ref 98–107)
CK SERPL-CCNC: 1838 U/L (ref 20–180)
CO2 SERPL-SCNC: 25 MMOL/L (ref 22–29)
CREAT SERPL-MCNC: 0.4 MG/DL (ref 0.5–1)
ECHO AV AREA PEAK VELOCITY: 2.3 CM2
ECHO AV AREA VTI: 2.5 CM2
ECHO AV AREA/BSA PEAK VELOCITY: 1.6 CM2/M2
ECHO AV AREA/BSA VTI: 1.7 CM2/M2
ECHO AV CUSP MM: 1.2 CM
ECHO AV MEAN GRADIENT: 6 MMHG
ECHO AV MEAN VELOCITY: 1.1 M/S
ECHO AV PEAK GRADIENT: 11 MMHG
ECHO AV PEAK VELOCITY: 1.7 M/S
ECHO AV VELOCITY RATIO: 0.76
ECHO AV VTI: 37.6 CM
ECHO BSA: 1.49 M2
ECHO EST RA PRESSURE: 3 MMHG
ECHO LA DIAMETER INDEX: 2.91 CM/M2
ECHO LA DIAMETER: 4.3 CM
ECHO LA VOL A-L A2C: 50 ML (ref 22–52)
ECHO LA VOL A-L A4C: 74 ML (ref 22–52)
ECHO LA VOL MOD A2C: 47 ML (ref 22–52)
ECHO LA VOL MOD A4C: 73 ML (ref 22–52)
ECHO LA VOLUME AREA LENGTH: 63 ML
ECHO LA VOLUME INDEX A-L A2C: 34 ML/M2 (ref 16–34)
ECHO LA VOLUME INDEX A-L A4C: 50 ML/M2 (ref 16–34)
ECHO LA VOLUME INDEX AREA LENGTH: 43 ML/M2 (ref 16–34)
ECHO LA VOLUME INDEX MOD A2C: 32 ML/M2 (ref 16–34)
ECHO LA VOLUME INDEX MOD A4C: 49 ML/M2 (ref 16–34)
ECHO LV E' LATERAL VELOCITY: 8 CM/S
ECHO LV E' SEPTAL VELOCITY: 5 CM/S
ECHO LV EF PHYSICIAN: 65 %
ECHO LV FRACTIONAL SHORTENING: 34 % (ref 28–44)
ECHO LV INTERNAL DIMENSION DIASTOLE INDEX: 2.57 CM/M2
ECHO LV INTERNAL DIMENSION DIASTOLIC: 3.8 CM (ref 3.9–5.3)
ECHO LV INTERNAL DIMENSION SYSTOLIC INDEX: 1.69 CM/M2
ECHO LV INTERNAL DIMENSION SYSTOLIC: 2.5 CM
ECHO LV ISOVOLUMETRIC RELAXATION TIME (IVRT): 73.8 MS
ECHO LV IVSD: 1 CM (ref 0.6–0.9)
ECHO LV IVSS: 1.2 CM
ECHO LV MASS 2D: 109 G (ref 67–162)
ECHO LV MASS INDEX 2D: 73.7 G/M2 (ref 43–95)
ECHO LV POSTERIOR WALL DIASTOLIC: 0.9 CM (ref 0.6–0.9)
ECHO LV POSTERIOR WALL SYSTOLIC: 1.3 CM
ECHO LV RELATIVE WALL THICKNESS RATIO: 0.47
ECHO LVOT AREA: 3.1 CM2
ECHO LVOT AV VTI INDEX: 0.81
ECHO LVOT DIAM: 2 CM
ECHO LVOT MEAN GRADIENT: 3 MMHG
ECHO LVOT PEAK GRADIENT: 6 MMHG
ECHO LVOT PEAK VELOCITY: 1.3 M/S
ECHO LVOT STROKE VOLUME INDEX: 64.5 ML/M2
ECHO LVOT SV: 95.5 ML
ECHO LVOT VTI: 30.4 CM
ECHO MV "A" WAVE DURATION: 143 MSEC
ECHO MV A VELOCITY: 0.98 M/S
ECHO MV AREA PHT: 3 CM2
ECHO MV AREA VTI: 2.9 CM2
ECHO MV E DECELERATION TIME (DT): 342 MS
ECHO MV E VELOCITY: 0.86 M/S
ECHO MV E/A RATIO: 0.88
ECHO MV E/E' LATERAL: 10.75
ECHO MV E/E' RATIO (AVERAGED): 13.98
ECHO MV LVOT VTI INDEX: 1.08
ECHO MV MAX VELOCITY: 1 M/S
ECHO MV MEAN GRADIENT: 2 MMHG
ECHO MV MEAN VELOCITY: 0.7 M/S
ECHO MV PEAK GRADIENT: 4 MMHG
ECHO MV PRESSURE HALF TIME (PHT): 73.8 MS
ECHO MV VTI: 32.7 CM
ECHO PV MAX VELOCITY: 0.9 M/S
ECHO PV MEAN GRADIENT: 2 MMHG
ECHO PV MEAN VELOCITY: 0.6 M/S
ECHO PV PEAK GRADIENT: 3 MMHG
ECHO PV VTI: 21.9 CM
ECHO PVEIN A DURATION: 143 MS
ECHO PVEIN A VELOCITY: 0.4 M/S
ECHO PVEIN PEAK D VELOCITY: 0.4 M/S
ECHO PVEIN PEAK S VELOCITY: 0.7 M/S
ECHO PVEIN S/D RATIO: 1.8
ECHO RIGHT VENTRICULAR SYSTOLIC PRESSURE (RVSP): 33 MMHG
ECHO RV TAPSE: 2.9 CM (ref 1.7–?)
ECHO TV REGURGITANT MAX VELOCITY: 2.76 M/S
ECHO TV REGURGITANT PEAK GRADIENT: 30 MMHG
EOSINOPHIL # BLD: 0.08 K/UL (ref 0.05–0.5)
EOSINOPHILS RELATIVE PERCENT: 1 % (ref 0–6)
ERYTHROCYTE [DISTWIDTH] IN BLOOD BY AUTOMATED COUNT: 13.7 % (ref 11.5–15)
GFR SERPL CREATININE-BSD FRML MDRD: >90 ML/MIN/1.73M2
GLUCOSE SERPL-MCNC: 142 MG/DL (ref 74–99)
HCT VFR BLD AUTO: 36.2 % (ref 34–48)
HGB BLD-MCNC: 11.4 G/DL (ref 11.5–15.5)
IMM GRANULOCYTES # BLD AUTO: <0.03 K/UL (ref 0–0.58)
IMM GRANULOCYTES NFR BLD: 0 % (ref 0–5)
LACTATE BLDV-SCNC: 1.3 MMOL/L (ref 0.5–2.2)
LYMPHOCYTES NFR BLD: 2.21 K/UL (ref 1.5–4)
LYMPHOCYTES RELATIVE PERCENT: 25 % (ref 20–42)
MCH RBC QN AUTO: 28.7 PG (ref 26–35)
MCHC RBC AUTO-ENTMCNC: 31.5 G/DL (ref 32–34.5)
MCV RBC AUTO: 91.2 FL (ref 80–99.9)
MONOCYTES NFR BLD: 0.96 K/UL (ref 0.1–0.95)
MONOCYTES NFR BLD: 11 % (ref 2–12)
NEUTROPHILS NFR BLD: 62 % (ref 43–80)
NEUTS SEG NFR BLD: 5.45 K/UL (ref 1.8–7.3)
PLATELET # BLD AUTO: 301 K/UL (ref 130–450)
PMV BLD AUTO: 9.8 FL (ref 7–12)
POTASSIUM SERPL-SCNC: 3.9 MMOL/L (ref 3.5–5)
PROT SERPL-MCNC: 6.5 G/DL (ref 6.4–8.3)
RBC # BLD AUTO: 3.97 M/UL (ref 3.5–5.5)
SODIUM SERPL-SCNC: 132 MMOL/L (ref 132–146)
WBC OTHER # BLD: 8.8 K/UL (ref 4.5–11.5)

## 2024-04-04 PROCEDURE — 85025 COMPLETE CBC W/AUTO DIFF WBC: CPT

## 2024-04-04 PROCEDURE — 2060000000 HC ICU INTERMEDIATE R&B

## 2024-04-04 PROCEDURE — 6360000004 HC RX CONTRAST MEDICATION: Performed by: RADIOLOGY

## 2024-04-04 PROCEDURE — 82550 ASSAY OF CK (CPK): CPT

## 2024-04-04 PROCEDURE — 74176 CT ABD & PELVIS W/O CONTRAST: CPT

## 2024-04-04 PROCEDURE — 93306 TTE W/DOPPLER COMPLETE: CPT

## 2024-04-04 PROCEDURE — 99223 1ST HOSP IP/OBS HIGH 75: CPT | Performed by: STUDENT IN AN ORGANIZED HEALTH CARE EDUCATION/TRAINING PROGRAM

## 2024-04-04 PROCEDURE — 6370000000 HC RX 637 (ALT 250 FOR IP): Performed by: GENERAL PRACTICE

## 2024-04-04 PROCEDURE — 2580000003 HC RX 258: Performed by: GENERAL PRACTICE

## 2024-04-04 PROCEDURE — 74018 RADEX ABDOMEN 1 VIEW: CPT

## 2024-04-04 PROCEDURE — 83605 ASSAY OF LACTIC ACID: CPT

## 2024-04-04 PROCEDURE — 80053 COMPREHEN METABOLIC PANEL: CPT

## 2024-04-04 RX ORDER — DOCUSATE SODIUM 100 MG/1
100 CAPSULE, LIQUID FILLED ORAL 2 TIMES DAILY
Status: DISCONTINUED | OUTPATIENT
Start: 2024-04-04 | End: 2024-04-08 | Stop reason: HOSPADM

## 2024-04-04 RX ORDER — SODIUM CHLORIDE 9 MG/ML
INJECTION, SOLUTION INTRAVENOUS CONTINUOUS
Status: DISCONTINUED | OUTPATIENT
Start: 2024-04-04 | End: 2024-04-08 | Stop reason: HOSPADM

## 2024-04-04 RX ADMIN — SODIUM CHLORIDE: 9 INJECTION, SOLUTION INTRAVENOUS at 11:10

## 2024-04-04 RX ADMIN — IOPAMIDOL 18 ML: 755 INJECTION, SOLUTION INTRAVENOUS at 18:21

## 2024-04-04 RX ADMIN — DOCUSATE SODIUM 100 MG: 100 CAPSULE, LIQUID FILLED ORAL at 11:10

## 2024-04-04 ASSESSMENT — PAIN SCALES - GENERAL: PAINLEVEL_OUTOF10: 0

## 2024-04-04 NOTE — H&P
[place] [address]                           HISTORY & PHYSICAL      PATIENT NAME: CALISTA LAYNE               : 1940  MED REC NO: [id number]                        ROOM: 06  ACCOUNT NO: [id number]                       ADMIT DATE: 2024  PROVIDER: Diogo Coyne DO      HISTORY OF PRESENT ILLNESS:  83-year-old female presented to the emergency department with a history of nausea.  States that she had about 3 or 4 episodes of nausea, seems to be okay now.  She denies any significant abdominal pain.  Evaluated in the emergency room with a CT scan of the abdomen, demonstrated what was thought to be an ileus or developing small bowel obstruction.  She denies any chest pain, palpitations, orthopnea, paroxysmal nocturnal dyspnea, or shortness of breath.  However, her troponins were significantly elevated as well in the 700 to 800 range.  Because of these findings, she was subsequently admitted for surgical and cardiac evaluation.  No medications recently on file.    PAST MEDICAL HISTORY:  Significant for hyperlipidemia, squamous cell carcinoma, melanoma, fractured hip.    SURGICAL HISTORY:  Breast surgery, hip arthroplasty, skin biopsies.    SOCIAL HISTORY:  The patient is a nonsmoker, nondrinker.  Denies use narcotic drugs or alcoholic beverages.    LABORATORY DATA:  White count was normal.  Slight elevation of LFTs with an AST of 71 and ALT of 58.    REVIEW OF SYSTEMS:  CONSTITUTIONAL:  Negative for vertigo, cephalgia, ringing in the ears, hearing loss, difficulty swallowing, hoarseness in her voice, bloody noses.  CARDIOPULMONARY SYSTEM:  She has no chest pain, palpitations, orthopnea, paroxysmal nocturnal dyspnea.  No cough, wheeze, shortness of breath, hemoptysis, or pleuritic pain.  GASTROINTESTINAL SYSTEM:  Some nausea with vomiting.  No diarrhea or constipation.  Some abdominal pain.  GENITOURINARY SYSTEM:

## 2024-04-04 NOTE — PLAN OF CARE
Problem: ABCDS Injury Assessment  Goal: Absence of physical injury  4/4/2024 0046 by Horacio Guy RN  Outcome: Progressing  4/3/2024 1225 by Lashanda Mendzoa RN  Outcome: Progressing     Problem: Discharge Planning  Goal: Discharge to home or other facility with appropriate resources  4/4/2024 0046 by Horacio Guy RN  Outcome: Progressing  4/3/2024 1225 by Lashanda Mendoza RN  Outcome: Progressing     Problem: Skin/Tissue Integrity  Goal: Absence of new skin breakdown  Description: 1.  Monitor for areas of redness and/or skin breakdown  2.  Assess vascular access sites hourly  3.  Every 4-6 hours minimum:  Change oxygen saturation probe site  4.  Every 4-6 hours:  If on nasal continuous positive airway pressure, respiratory therapy assess nares and determine need for appliance change or resting period.  4/4/2024 0046 by Horacio Guy RN  Outcome: Progressing  4/3/2024 1225 by Lashanda Mendoza RN  Outcome: Progressing     Problem: Pain  Goal: Verbalizes/displays adequate comfort level or baseline comfort level  4/4/2024 0046 by Horacio Guy RN  Outcome: Progressing  4/3/2024 1225 by Lashanda Mendoza RN  Outcome: Progressing     Problem: Safety - Adult  Goal: Free from fall injury  Outcome: Progressing

## 2024-04-04 NOTE — PROGRESS NOTES
CT reviewed  Severely dilated small bowel with pneumatosis of small bowel which is concerning for ischemia  Her exam earlier today she had no tenderness which the nursing staff states she doesn't at this point either  Discussed this with her medical POA Miles over the phone  He lives in [place] and cannot get here  I discussed that best plan for Haleigh would be NPO/NGT and abx because with her medical co morbidities she wouldn't tolerate surgery well at all and he is in agreement  I discussed that this could progress and it would push us to operate and he would have be decide at that point in time if that is what they would like to do    Ordered NPO/NGT, Zosyn, and labs  Will follow for worsening signs of ischemia or decompensation    Electronically signed by Kyle Nuñez DO on 4/4/2024 at 7:45 PM

## 2024-04-04 NOTE — PROGRESS NOTES
[facility] Quality Flow/Interdisciplinary Rounds Progress Note        Quality Flow Rounds held on April 4, 2024    Disciplines Attending:  Bedside Nurse, , , and Nursing Unit Leadership    Haleigh Stacy was admitted on 4/1/2024 10:08 PM    Anticipated Discharge Date:   tbd    Disposition: home    Landon Score:  Landon Scale Score: 18    Readmission Risk              Risk of Unplanned Readmission:  8           Discussed patient goal for the day, patient clinical progression, and barriers to discharge.  The following Goal(s) of the Day/Commitment(s) have been identified:   surgery plan, CT      Zayda Bellamy RN  April 4, 2024

## 2024-04-04 NOTE — PROGRESS NOTES
GENERAL SURGERY  DAILY PROGRESS NOTE  4/4/2024  Chief Complaint   Patient presents with    Emesis       Subjective:  Tolerating regular diet, passing gas and had a bowel movement. Denies nausea or emesis    Objective:  BP (!) 95/44   Pulse 58   Temp 98.2 °F (36.8 °C) (Oral)   Resp 18   Ht 1.53 m (5' 0.24\")   Wt 52.2 kg (115 lb)   SpO2 95%   BMI 22.28 kg/m²     GENERAL:  Laying in bed, awake, alert, cooperative, no apparent distress  HEAD: Normocephalic, atraumatic  EYES: No sclera icterus, pupils equal  LUNGS:  No increased work of breathing  CARDIOVASCULAR:  R  ABDOMEN:  Soft, non-tender, non-distended  EXTREMITIES: No edema or swelling  SKIN: Warm and dry    Assessment/Plan:  83 y.o. female with partial SBO, resolved    - Continue regular diet  - No plan for surgical intervention, OK to discharge from surgical POV      Electronically signed by Gayatri Murphy MD on 4/4/2024 at 8:48 AM     Discussed with nursing   Difficult history and refusing all care  Needs repeat CT with PO contrast with her exam if she ever  agrees    Electronically signed by Kyle Nuñez DO on 4/4/2024 at 10:49 AM

## 2024-04-04 NOTE — PROGRESS NOTES
P Quality Flow/Interdisciplinary Rounds Progress Note        Quality Flow Rounds held on April 4, 2024    Disciplines Attending:  Bedside Nurse, , , and Nursing Unit Leadership    Haleigh Stacy was admitted on 4/1/2024 10:08 PM    Anticipated Discharge Date:   tbd    Disposition: home    Landon Score:  Landon Scale Score: 18    Readmission Risk              Risk of Unplanned Readmission:  8           Discussed patient goal for the day, patient clinical progression, and barriers to discharge.  The following Goal(s) of the Day/Commitment(s) have been identified:   repeat CT, surg plan      Zayda Bellamy RN  April 4, 2024

## 2024-04-05 LAB
ANION GAP SERPL CALCULATED.3IONS-SCNC: 14 MMOL/L (ref 7–16)
BASOPHILS # BLD: 0.05 K/UL (ref 0–0.2)
BASOPHILS NFR BLD: 1 % (ref 0–2)
BUN SERPL-MCNC: 10 MG/DL (ref 6–23)
CALCIUM SERPL-MCNC: 8.3 MG/DL (ref 8.6–10.2)
CHLORIDE SERPL-SCNC: 103 MMOL/L (ref 98–107)
CO2 SERPL-SCNC: 26 MMOL/L (ref 22–29)
CREAT SERPL-MCNC: 0.5 MG/DL (ref 0.5–1)
EOSINOPHIL # BLD: 0.05 K/UL (ref 0.05–0.5)
EOSINOPHILS RELATIVE PERCENT: 1 % (ref 0–6)
ERYTHROCYTE [DISTWIDTH] IN BLOOD BY AUTOMATED COUNT: 13.9 % (ref 11.5–15)
GFR SERPL CREATININE-BSD FRML MDRD: >90 ML/MIN/1.73M2
GLUCOSE SERPL-MCNC: 86 MG/DL (ref 74–99)
HCT VFR BLD AUTO: 35 % (ref 34–48)
HGB BLD-MCNC: 11 G/DL (ref 11.5–15.5)
IMM GRANULOCYTES # BLD AUTO: <0.03 K/UL (ref 0–0.58)
IMM GRANULOCYTES NFR BLD: 0 % (ref 0–5)
LYMPHOCYTES NFR BLD: 1.64 K/UL (ref 1.5–4)
LYMPHOCYTES RELATIVE PERCENT: 19 % (ref 20–42)
MCH RBC QN AUTO: 28.6 PG (ref 26–35)
MCHC RBC AUTO-ENTMCNC: 31.4 G/DL (ref 32–34.5)
MCV RBC AUTO: 90.9 FL (ref 80–99.9)
MONOCYTES NFR BLD: 0.81 K/UL (ref 0.1–0.95)
MONOCYTES NFR BLD: 9 % (ref 2–12)
NEUTROPHILS NFR BLD: 71 % (ref 43–80)
NEUTS SEG NFR BLD: 6.3 K/UL (ref 1.8–7.3)
PLATELET # BLD AUTO: 286 K/UL (ref 130–450)
PMV BLD AUTO: 9.8 FL (ref 7–12)
POTASSIUM SERPL-SCNC: 3.8 MMOL/L (ref 3.5–5)
RBC # BLD AUTO: 3.85 M/UL (ref 3.5–5.5)
SODIUM SERPL-SCNC: 143 MMOL/L (ref 132–146)
WBC OTHER # BLD: 8.9 K/UL (ref 4.5–11.5)

## 2024-04-05 PROCEDURE — 85025 COMPLETE CBC W/AUTO DIFF WBC: CPT

## 2024-04-05 PROCEDURE — 2060000000 HC ICU INTERMEDIATE R&B

## 2024-04-05 PROCEDURE — 36415 COLL VENOUS BLD VENIPUNCTURE: CPT

## 2024-04-05 PROCEDURE — 2580000003 HC RX 258: Performed by: STUDENT IN AN ORGANIZED HEALTH CARE EDUCATION/TRAINING PROGRAM

## 2024-04-05 PROCEDURE — 97161 PT EVAL LOW COMPLEX 20 MIN: CPT

## 2024-04-05 PROCEDURE — 97530 THERAPEUTIC ACTIVITIES: CPT

## 2024-04-05 PROCEDURE — 97165 OT EVAL LOW COMPLEX 30 MIN: CPT

## 2024-04-05 PROCEDURE — 6370000000 HC RX 637 (ALT 250 FOR IP): Performed by: GENERAL PRACTICE

## 2024-04-05 PROCEDURE — 6360000002 HC RX W HCPCS: Performed by: STUDENT IN AN ORGANIZED HEALTH CARE EDUCATION/TRAINING PROGRAM

## 2024-04-05 PROCEDURE — 80048 BASIC METABOLIC PNL TOTAL CA: CPT

## 2024-04-05 PROCEDURE — 99233 SBSQ HOSP IP/OBS HIGH 50: CPT | Performed by: STUDENT IN AN ORGANIZED HEALTH CARE EDUCATION/TRAINING PROGRAM

## 2024-04-05 PROCEDURE — 99223 1ST HOSP IP/OBS HIGH 75: CPT | Performed by: NURSE PRACTITIONER

## 2024-04-05 RX ADMIN — PIPERACILLIN AND TAZOBACTAM 3375 MG: 3; .375 INJECTION, POWDER, LYOPHILIZED, FOR SOLUTION INTRAVENOUS at 23:29

## 2024-04-05 RX ADMIN — DOCUSATE SODIUM 100 MG: 100 CAPSULE, LIQUID FILLED ORAL at 20:13

## 2024-04-05 RX ADMIN — PIPERACILLIN AND TAZOBACTAM 3375 MG: 3; .375 INJECTION, POWDER, LYOPHILIZED, FOR SOLUTION INTRAVENOUS at 08:05

## 2024-04-05 RX ADMIN — PIPERACILLIN AND TAZOBACTAM 3375 MG: 3; .375 INJECTION, POWDER, LYOPHILIZED, FOR SOLUTION INTRAVENOUS at 00:14

## 2024-04-05 RX ADMIN — PIPERACILLIN AND TAZOBACTAM 3375 MG: 3; .375 INJECTION, POWDER, LYOPHILIZED, FOR SOLUTION INTRAVENOUS at 16:56

## 2024-04-05 NOTE — PROGRESS NOTES
[facility] HEALTH SERVICES [facility]  PROGRESS NOTE    Name: Haleigh Stacy                Latter day: Mormon   Anointed (Last Rites): NA    Referral:  Palliative Care Consult    Assessment:  Upon entering the room  observes Patient awake and laying down in bed. Patient is wearing what looks like an ace bandage wrapped around the top of her head.       Intervention:  During conversation Patient mentioned that she attended [employer].  asked Patient if  could contact the [facility] on Patient's behalf. Patient agreed.  reached out to [facility] and spoke with  who knows Patient and was glad to hear about Patient wanting contact with [facility].  will inform  who is not in the office on Fridays, so that he will possibly come and visit Patient over the weekend.   Patient has a brother and sister in law who live in the [facility].  Patient was open to conversation and prayer.    Outcome:  Patient was appreciative of  visit.  provided prayer and prayer card. Patient seemed confused when  brought up ACP documents. From the possible confusion,  did not try to further the conversation. Patient does have DNR--CC in medical records.    Plan:  Chaplains will remain available to offer spiritual and emotional support as needed.      Electronically signed by HARVEY Boston, on 4/5/2024 at 12:30 PM.  Spiritual Care Department  [facility]  [phone]

## 2024-04-05 NOTE — PROGRESS NOTES
HOSPICE  THE [facility]    Consult received. Chart reviewed. Visited Haleigh at bedside. No family present. She was sitting up in bed, waiting on lunch to come.     The hospice benefit and philosophy were explained including that hospice is end of life care in which, per Medicare, a patient has a terminal diagnosis that life expectancy would be 6 months or less. Explained that once in hospice care, all aggressive treatments would be stopped and allow nature to takes its course with focus on comfort care for the patient.  Explained hospice services at home, at [facility] with room/board private pay unless patient has Medicaid and the [facility] for short-term symptom management and respite.    Haleigh has no family here in [place]. She has a brother Miles in [place]. Discussed hospice at home and that eventually she may need someone to take care of her, at this time she does not want to go to a facility, she feels she is still able to care for herself. She is agreeable for me to call her brother to give information. Discussed with Miles and he is agreeable with what Haleigh wants. Spoke with ADAM Quintanilla, patient is agreeable to go to rehab first. [facility] will continue to follow until patient is discharged to skilled facility.     CM and charge nurse updated.     Addendum. 1530. Patient is going skilled to [facility]. [facility] will sign off. Thank you for the consult.     Electronically signed by Trish Jones RN on 4/5/2024 at 12:00 PM  [phone]; [phone]

## 2024-04-05 NOTE — CONSULTS
[facility]  [facility]  Consultation Note     Admit Date: 4/1/2024 10:08 PM    Reason for Consult:   Pneumatosis coli    Attending Physician:  Diogo Coyne DO    HISTORY OF PRESENT ILLNESS:             The history is obtained from extensive review of available past medical records. The patient is a 83 y.o. female who is unknown to the ID service.  The patient presented to the ED at [facility] on 4/1/2024 with nausea and vomiting after she ate her dinner.  On presentation she was afebrile and vital signs were unremarkable.  She has not had a fever here.  She was seen by general surgery.  She was thought to have partial small bowel obstruction that resolved.  They are not planning for any interventions.  CT of the abdomen and pelvis also showed pneumatosis coli.  She is currently on Zosyn.  She is actually feeling better.  She has no nausea or vomiting.  No longer having abdominal pain.    Past Medical History:        Diagnosis Date    Hyperlipidemia     Nausea and vomiting 4/4/2024    SCC (squamous cell carcinoma) 3/7/2011     Past Surgical History:        Procedure Laterality Date    BREAST SURGERY      pt unknown what breast lump removed    HIP SURGERY Right 9/6/2020    HIP HEMIARTHROPLASTY performed by Souleymane Olmedo MD at [facility] OR    HYSTERECTOMY (CERVIX STATUS UNKNOWN)  2005    pt unsure of med hx    SKIN BIOPSY       Current Medications:   Scheduled Meds:   docusate sodium  100 mg Oral BID    piperacillin-tazobactam  3,375 mg IntraVENous Q8H     Continuous Infusions:   sodium chloride 100 mL/hr at 04/04/24 1110     PRN Meds:iopamidol, perflutren lipid microspheres, white petrolatum    Allergies:  Patient has no known allergies.    Social History:   Social History     Socioeconomic History    Marital status: Single   Tobacco Use    Smoking status: Never    Smokeless tobacco: Never   Substance and Sexual Activity    Alcohol use: No    Drug use: No     Social

## 2024-04-05 NOTE — CONSULTS
Palliative Care Department  [phone]  Palliative Care Initial Consult  Provider LO Tobar CNP      PATIENT: Haleigh Stacy  : 1940  MRN: [id number]  ADMISSION DATE: 2024 10:08 PM  Referring Provider: Mike Main DO     Palliative Medicine was consulted on hospital day 4 for assistance with Goals of care    HPI:     Clinical Summary:Haleigh Stacy is a 83 y.o. y/o female with a history of hyperlipidemia, hysterectomy, melanoma, hip fracture who presented to [facility] on 2024 with nausea.  CT of the abdomen showed Severely dilated small bowel with pneumatosis of small bowel which is concerning for ischemia.  General surgery is following and recommended conservative management with NG tube and antibiotics.  The patient pulled out her NG tube and has refusing to have it replaced or have further interventions or workup.  Palliative care was consulted for goals of care discussion.    ASSESSMENT/PLAN:     Pertinent Hospital Diagnoses     Small bowel obstruction  Nausea    Palliative Care Encounter / Counseling Regarding Goals of Care  Please see detailed goals of care discussion as below  At this time, Haleigh Stacy, Does have capacity for medical decision-making.  Capacity is time limited and situation/question specific  During encounter Miles was surrogate medical decision-maker  Outcome of goals of care meeting:  Continue supportive medical management with antibiotics and IV fluids  CODE STATUS changed to DNR CC  Consult placed to HOTV  Code status DNR-CC  Advanced Directives: no POA or living will in epic  Surrogate/Legal NOK:  Miles Stacy (brother) [phone]    Spiritual assessment: no spiritual distress identified  Bereavement and grief: to be determined  Referrals to: none today    Thank you for the opportunity to participate in the care of Haleigh Stacy.     LO Tobar CNP   Palliative Medicine     SUBJECTIVE:     Details of Conversation: Chart reviewed and met  with Haleigh at the bedside.  She is alert and oriented and able to express her wishes.  She explains that she came to the hospital due to the nausea and vomiting, but that it has improved.  She denies any current nausea or abdominal pain.  We had discussion about goals of care and advanced directives.  Patient remains adamant against an NG tube or surgery.  She is also not an ideal candidate for surgery.  Patient requested to be placed on a regular diet, she is waiting for food to come up from the cafeteria.  The patient is okay with continuing antibiotics, but her goal is to return home as soon as possible.  We had discussion about CODE STATUS she expresses that she does not want chest compressions, defibrillation, or to be placed on a ventilator.  Patient is okay with supportive medical management but would not want any heroic or aggressive measures.  CODE STATUS was changed to a DNR CC.  Discussed case with surgeon and spoke with the patient's brother Miles on the phone.  Miles explains that he has been managing her finances and that he is healthcare power of .  I explained the conversation that I had with his sister and her wishes about no heroic measures or aggressive treatment.  I explained that she is very high risk for not being able to tolerate the diet and reoccurring symptoms.  I explained given the patient's wishes the recommendation of hospice care.  He states his understanding and supports his sister's wishes.  He did not have a preference for agency as he lives out of town, explained [facility] for inpatient symptom management if needed, and he requested referral there. I had a further discussion with Haleigh about hospice care. She stated her understanding that hospice care is for people that are \"pretty bad off.\"  She confirms that she does need some help and that she would be agreeable to hospice services.  Will continue to follow.    Prognosis: Guarded    OBJECTIVE:     BP (!) 121/57

## 2024-04-05 NOTE — PROGRESS NOTES
GENERAL SURGERY  DAILY PROGRESS NOTE  4/5/2024  Chief Complaint   Patient presents with    Emesis       Subjective:    No changes overnight, removed NG, refuses to have it replaced    Objective:  BP (!) 121/55   Pulse 52   Temp 98 °F (36.7 °C) (Axillary)   Resp 18   Ht 1.53 m (5' 0.24\")   Wt 52.2 kg (115 lb)   SpO2 95%   BMI 22.28 kg/m²     GENERAL:  Laying in bed, awake  HEAD: Normocephalic, atraumatic  EYES: No sclera icterus, pupils equal  LUNGS:  No increased work of breathing  CARDIOVASCULAR:  RR  ABDOMEN:  Soft, non-tender, distended  EXTREMITIES: No edema or swelling  SKIN: Warm and dry    Assessment/Plan:  83 y.o. female with severe SBO, pneumatosis      - Patient removed NG and refuses to have it replaced  - she is alert and oriented and knows exactly the risks of refusing treatment  - we will continue zosyn and she is demanding a regular diet which we will give but at this point palliative care is our only option because surgical intervention for her isnt a great option with her co morbidities    Electronically signed by Kyle Nuñez DO on 4/5/2024 at 10:16 AM

## 2024-04-05 NOTE — PROGRESS NOTES
Occupational Therapy  OCCUPATIONAL THERAPY INITIAL EVALUATION  [facility]  [address], [place]    Date: 2024     Patient Name: Haleigh Stacy  MRN: [id number]  : 1940  Room: [address] [place] [address]    Evaluating OT: Liegha Alegre, OTR/L - [id number]    Referring Provider: Diogo Coyne DO   Specific Provider Orders/Date: \"OT eval and treat\" - 2024    Diagnosis: Elevated troponin [R79.89]  Nausea and vomiting, unspecified vomiting type [R11.2]      Pertinent Medical History: HLD, R hip hemiarthroplasty     Precautions: fall risk    Assessment of Current Deficits:    [x] Functional mobility   [x]ADLs  [x] Strength               []Cognition   [x] Functional transfers   [x] IADLs         [x] Safety Awareness   [x]Endurance   [] Fine Coordination              [x] Balance      [] Vision/perception   []Sensation    []Gross Motor Coordination  [] ROM  [] Delirium                   [] Motor Control     OT PLAN OF CARE   OT POC is based on physician orders, patient diagnosis, and results of clinical assessment.  Frequency/Duration 2-5 days/week for 2 weeks PRN   Specific OT Treatment Interventions to Include:   * Instruction/training on adapted ADL techniques and AE recommendations to increase functional independence within precautions       * Training on energy conservation strategies, correct breathing pattern and techniques to improve independence/tolerance for self-care routine  * Functional transfer/mobility training/DME recommendations for increased independence, safety, and fall prevention  * Patient/Family education to increase follow through with safety techniques and functional independence  * Recommendation of environmental modifications for increased safety with functional transfers/mobility and ADLs  * Therapeutic exercise to improve motor endurance, ROM, and functional strength for ADLs/functional transfers  * Therapeutic activities to  tasks. Patient verbalized understanding.    Further skilled OT treatment indicated to increase patient's safety and independence with completion of ADL/IADL tasks in order to maximize patient's functional independence and quality of life.    Rehab Potential: Good for established goals.  Patient / Family Goal: to go home  Patient and/or family were instructed on functional diagnosis, prognosis/goals, and OT plan of care. Verbalized understanding.    Eval Complexity: low    Time In: 1251  Time Out: 1304  Total Treatment Time: 0 minutes      Minutes Units   OT Eval Low 59171 13 1   OT Eval Medium 81699     OT Eval High 49187     OT Re-Eval 84131     Therapeutic Ex 63257     Therapeutic Activities 45956     ADL/Self Care 09478     Orthotic Management 63677     Neuro Re-Ed 83021     Non-Billable Time N/A ---     Evaluation time includes thorough review of current medical information, gathering information on past medical history/social history and prior level of function, completion of standardized testing/informal observation of tasks, assessment of data, and education on plan of care and goals.    Leigha Alegre OTR/L  License Number: [id number]

## 2024-04-05 NOTE — PROGRESS NOTES
Patient seen seems okay. Vss afebrile. Labs stable . Will recheck cpk and troponins. Echo normal . No chest pain. Ct of abdomen, pneumotosis coli . Surgery  covering. Will have id see for further antibiotics. Normal appetite and faif bms.

## 2024-04-05 NOTE — CARE COORDINATION
Social Work / Discharge Planning : SW followed up with patient . Patient pulled out her NG tube and does NOT want it re-inserted. SW discussed her refusal of NG tube/  treatment. Patient not agreeable for surgery recommendations and asking for SW when she can go home.Patient resides alone in an apartment.  Patient verified plan is HOME and denies any HOME needs.Patient states family can transport. Patient on RA. SW to follow. Electronically signed by JEANMARIE Thompson on 4/5/24 at 10:41 AM EDT     Addendum: Palliative consulted and they met with patient. Patient in agreement to speak with [facility] . Palliative stated they will call in consult to \[facility]"".. \[facility]\"" will see a patient that has  bed bugs per Sol at \[facility]\"". Await meeting. SW to follow. Electronically signed by JEANMARIE Thompson on 4/5/24 at 11:15 AM EDT     Addendum:  Sol from Hospice met with patient but patient asking for help at home with mcconnell etc and doesn't seem to grasp full concept. SW re-approached patient again and discussed at length goals of care. Patient has a past hx at [place]. Patient agreeable NOW to go to  rehab with the goal to return home. Hospice updated. Therapy has been ordered and await therapy input to better assist with discharge planning. Again, patient NOW agreeable to SNF placement. Patient requested [facility] and await therapy input to see if it supports the needs for SNF.. SW to follow. Electronically signed by JEANMARIE Thompson on 4/5/24 at 12:02 PM EDT     Addendum: Therapy completed.16/24.  Referral to Loret[facility] for [place]. Await acceptance/ denial. Will need pre-cert. SW to follow. Electronically signed by JEANMARIE Thompson on 4/5/24 at 3:04 PM EDT     Addendum: [facility]ito accepted and submitted pre-cert. N 17 generated,HENS and transport forms completed. Miles IZAGUIRRE updated and in full agreement with plan. Sol from \[facility]\"" updated as well as RN.. SW to follow. Electronically signed by

## 2024-04-05 NOTE — PROGRESS NOTES
Physical Therapy  Initial Assessment     Name: Haleigh Stacy  : 1940  MRN: [id number]      Date of Service: 2024    Evaluating PT: Reji Ellis, PT, DPT [id number]      Room #:  0631/0631-A  Diagnosis:  Elevated troponin [R79.89]  Nausea and vomiting, unspecified vomiting type [R11.2]  PMHx/PSHx:   has a past medical history of Hyperlipidemia, Nausea and vomiting, and SCC (squamous cell carcinoma).  Precautions:  Fall risk, Alarms    SUBJECTIVE:    Pt lives alone in a single story apartment with level entry. Pt ambulated without AD prior to admission.    OBJECTIVE:   Initial Evaluation  Date: 24 Treatment Date: Short Term/ Long Term   Goals   AM-PAC 6 Clicks      Was pt agreeable to Eval/treatment? Yes     Does pt have pain? No complaints of pain     Bed Mobility  Rolling: NT  Supine to sit: SBA  Sit to supine: SBA  Scooting: SBA to EOB  Rolling: Independent   Supine to sit: Independent   Sit to supine: Independent   Scooting: Independent    Transfers Sit to stand: Pablo  Stand to sit: Pablo  Stand pivot: Pablo with WW  Sit to stand: Independent   Stand to sit: Independent   Stand pivot: Independent    Ambulation   50 feet with WW with Pablo  >200 feet with WW Mod Independent    Stair negotiation: ascended and descended NT  >4 step(s) with 1 rail(s) with Supervision   ROM BUE: Refer to OT note  BLE: WFL     Strength BUE: Refer to OT note  BLE: WFL     Balance Sitting EOB: SBA  Dynamic Standing: Pablo with WW  Sitting EOB: Independent   Dynamic Standing: Supervision with WW     Pt is A & O x: 4 to person, place, month/year, and situation.   Sensation: Pt denies numbness and tingling of extremities.   Edema: Unremarkable    Patient education  Pt educated on PT role in acute care setting.    Patient response to education:   Pt verbalized understanding Pt demonstrated skill Pt requires further education in this area   Yes NA No     ASSESSMENT:    Conditions Requiring Skilled Therapeutic

## 2024-04-05 NOTE — PROGRESS NOTES
Pt pulled out NG tube. General surgery order NG tube to be replaced. Patient is refusing to have NG tube put back in. Patient is also refusing morning labs. Spoke with Dr. Nuñez about NG tube refusal and he said there is nothing else they can do if she is refusing.

## 2024-04-05 NOTE — DISCHARGE INSTR - COC
Continuity of Care Form    Patient Name: Haleigh Stacy   :  1940  MRN:  [id number]    Admit date:  2024  Discharge date:  24    Code Status Order: DNR-CC   Advance Directives:     Admitting Physician:  Diogo Coyne DO  PCP: Diogo Coyne DO    Discharging Nurse: tiffani rn  Discharging Hospital Unit/Room#: 0631/0631-A  Discharging Unit Phone Number: [phone]    Emergency Contact:   Extended Emergency Contact Information  Primary Emergency Contact: Miles Stacy  Home Phone: [phone]  Relation: Brother/Sister  Secondary Emergency Contact: sofia stacy  Home Phone: [phone]  Relation: Brother/Sister    Past Surgical History:  Past Surgical History:   Procedure Laterality Date    BREAST SURGERY      pt unknown what breast lump removed    HIP SURGERY Right 2020    HIP HEMIARTHROPLASTY performed by Souleymane Olmedo MD at [facility] OR    HYSTERECTOMY (CERVIX STATUS UNKNOWN)      pt unsure of med hx    SKIN BIOPSY         Immunization History:     There is no immunization history on file for this patient.    Active Problems:  Patient Active Problem List   Diagnosis Code    SCC (squamous cell carcinoma) C44.92    Melanoma ([facility]) C43.9    Closed displaced fracture of right femoral neck ([facility]) S72.001A    Status post hip hemiarthroplasty Z96.649    Elevated troponin R79.89    Nausea and vomiting R11.2    SBO (small bowel obstruction) ([facility]) K56.609       Isolation/Infection:   Isolation            No Isolation          Patient Infection Status       None to display                     Nurse Assessment:  Last Vital Signs: /67   Pulse 84   Temp 98.5 °F (36.9 °C) (Axillary)   Resp 18   Ht 1.53 m (5' 0.24\")   Wt 52.2 kg (115 lb)   SpO2 94%   BMI 22.28 kg/m²     Last documented pain score (0-10 scale): Pain Level: 0  Last Weight:   Wt Readings from Last 1 Encounters:   24 52.2 kg (115 lb)     Mental Status:  oriented and alert    IV Access:  - None    Nursing

## 2024-04-05 NOTE — PROGRESS NOTES
[facility] Quality Flow/Interdisciplinary Rounds Progress Note        Quality Flow Rounds held on April 5, 2024    Disciplines Attending:  Bedside Nurse, , , and Nursing Unit Leadership    Haleigh Stacy was admitted on 4/1/2024 10:08 PM    Anticipated Discharge Date:       Disposition:    Landon Score:  Landon Scale Score: 22    Readmission Risk              Risk of Unplanned Readmission:  11           Discussed patient goal for the day, patient clinical progression, and barriers to discharge.  The following Goal(s) of the Day/Commitment(s) have been identified:   await SNF choices, iv fluids, iv abx,       Betsy Yeung RN  April 5, 2024

## 2024-04-06 LAB
ANION GAP SERPL CALCULATED.3IONS-SCNC: 8 MMOL/L (ref 7–16)
BASOPHILS # BLD: 0.06 K/UL (ref 0–0.2)
BASOPHILS NFR BLD: 1 % (ref 0–2)
BUN SERPL-MCNC: 13 MG/DL (ref 6–23)
CALCIUM SERPL-MCNC: 8.4 MG/DL (ref 8.6–10.2)
CHLORIDE SERPL-SCNC: 103 MMOL/L (ref 98–107)
CK SERPL-CCNC: 1266 U/L (ref 20–180)
CO2 SERPL-SCNC: 25 MMOL/L (ref 22–29)
CREAT SERPL-MCNC: 0.6 MG/DL (ref 0.5–1)
EOSINOPHIL # BLD: 0.28 K/UL (ref 0.05–0.5)
EOSINOPHILS RELATIVE PERCENT: 4 % (ref 0–6)
ERYTHROCYTE [DISTWIDTH] IN BLOOD BY AUTOMATED COUNT: 13.7 % (ref 11.5–15)
GFR SERPL CREATININE-BSD FRML MDRD: 90 ML/MIN/1.73M2
GLUCOSE SERPL-MCNC: 86 MG/DL (ref 74–99)
HCT VFR BLD AUTO: 32.4 % (ref 34–48)
HGB BLD-MCNC: 10.2 G/DL (ref 11.5–15.5)
IMM GRANULOCYTES # BLD AUTO: <0.03 K/UL (ref 0–0.58)
IMM GRANULOCYTES NFR BLD: 0 % (ref 0–5)
LYMPHOCYTES NFR BLD: 2.78 K/UL (ref 1.5–4)
LYMPHOCYTES RELATIVE PERCENT: 40 % (ref 20–42)
MCH RBC QN AUTO: 28.5 PG (ref 26–35)
MCHC RBC AUTO-ENTMCNC: 31.5 G/DL (ref 32–34.5)
MCV RBC AUTO: 90.5 FL (ref 80–99.9)
MONOCYTES NFR BLD: 0.81 K/UL (ref 0.1–0.95)
MONOCYTES NFR BLD: 12 % (ref 2–12)
NEUTROPHILS NFR BLD: 43 % (ref 43–80)
NEUTS SEG NFR BLD: 3 K/UL (ref 1.8–7.3)
PLATELET # BLD AUTO: 257 K/UL (ref 130–450)
PMV BLD AUTO: 10.1 FL (ref 7–12)
POTASSIUM SERPL-SCNC: 4 MMOL/L (ref 3.5–5)
RBC # BLD AUTO: 3.58 M/UL (ref 3.5–5.5)
SODIUM SERPL-SCNC: 136 MMOL/L (ref 132–146)
TROPONIN I SERPL HS-MCNC: 1019 NG/L (ref 0–9)
WBC OTHER # BLD: 6.9 K/UL (ref 4.5–11.5)

## 2024-04-06 PROCEDURE — 2580000003 HC RX 258: Performed by: STUDENT IN AN ORGANIZED HEALTH CARE EDUCATION/TRAINING PROGRAM

## 2024-04-06 PROCEDURE — 99232 SBSQ HOSP IP/OBS MODERATE 35: CPT | Performed by: SURGERY

## 2024-04-06 PROCEDURE — 6370000000 HC RX 637 (ALT 250 FOR IP): Performed by: GENERAL PRACTICE

## 2024-04-06 PROCEDURE — 80048 BASIC METABOLIC PNL TOTAL CA: CPT

## 2024-04-06 PROCEDURE — 85025 COMPLETE CBC W/AUTO DIFF WBC: CPT

## 2024-04-06 PROCEDURE — 82550 ASSAY OF CK (CPK): CPT

## 2024-04-06 PROCEDURE — 2060000000 HC ICU INTERMEDIATE R&B

## 2024-04-06 PROCEDURE — 84484 ASSAY OF TROPONIN QUANT: CPT

## 2024-04-06 PROCEDURE — 2580000003 HC RX 258: Performed by: GENERAL PRACTICE

## 2024-04-06 PROCEDURE — 6360000002 HC RX W HCPCS: Performed by: STUDENT IN AN ORGANIZED HEALTH CARE EDUCATION/TRAINING PROGRAM

## 2024-04-06 RX ADMIN — DOCUSATE SODIUM 100 MG: 100 CAPSULE, LIQUID FILLED ORAL at 09:16

## 2024-04-06 RX ADMIN — PIPERACILLIN AND TAZOBACTAM 3375 MG: 3; .375 INJECTION, POWDER, LYOPHILIZED, FOR SOLUTION INTRAVENOUS at 09:16

## 2024-04-06 RX ADMIN — DOCUSATE SODIUM 100 MG: 100 CAPSULE, LIQUID FILLED ORAL at 19:55

## 2024-04-06 RX ADMIN — PIPERACILLIN AND TAZOBACTAM 3375 MG: 3; .375 INJECTION, POWDER, LYOPHILIZED, FOR SOLUTION INTRAVENOUS at 17:25

## 2024-04-06 RX ADMIN — SODIUM CHLORIDE: 9 INJECTION, SOLUTION INTRAVENOUS at 15:19

## 2024-04-06 RX ADMIN — SODIUM CHLORIDE: 9 INJECTION, SOLUTION INTRAVENOUS at 05:03

## 2024-04-06 ASSESSMENT — PAIN SCALES - GENERAL: PAINLEVEL_OUTOF10: 0

## 2024-04-06 NOTE — PLAN OF CARE
Problem: ABCDS Injury Assessment  Goal: Absence of physical injury  4/5/2024 2133 by Lexii Khanna RN  Outcome: Progressing  4/5/2024 1515 by Lacey Hicks RN  Outcome: Progressing     Problem: Discharge Planning  Goal: Discharge to home or other facility with appropriate resources  4/5/2024 2133 by Lexii Khanna RN  Outcome: Progressing  4/5/2024 1515 by Lacey Hicks RN  Outcome: Progressing     Problem: Skin/Tissue Integrity  Goal: Absence of new skin breakdown  Description: 1.  Monitor for areas of redness and/or skin breakdown  2.  Assess vascular access sites hourly  3.  Every 4-6 hours minimum:  Change oxygen saturation probe site  4.  Every 4-6 hours:  If on nasal continuous positive airway pressure, respiratory therapy assess nares and determine need for appliance change or resting period.  4/5/2024 2133 by Lexii Khanna RN  Outcome: Progressing  4/5/2024 1515 by Lacey Hicks RN  Outcome: Progressing     Problem: Pain  Goal: Verbalizes/displays adequate comfort level or baseline comfort level  4/5/2024 2133 by Lexii Khanna RN  Outcome: Progressing  4/5/2024 1515 by Lacey Hicks RN  Outcome: Progressing     Problem: Safety - Adult  Goal: Free from fall injury  Outcome: Progressing

## 2024-04-06 NOTE — PROGRESS NOTES
Patient seen  doing okay. Minimal abdominal pain and no n/v. Cpk downward, will follow. Recheck labs. . Tolerating antibiotics

## 2024-04-06 NOTE — PROGRESS NOTES
GENERAL SURGERY  DAILY PROGRESS NOTE  4/6/2024  Chief Complaint   Patient presents with    Emesis       Subjective:    Denies abdominal pain.  Denies nausea or vomiting.  Passing flatus.      Objective:  BP (!) 106/50   Pulse 64   Temp 97.8 °F (36.6 °C) (Oral)   Resp 18   Ht 1.53 m (5' 0.24\")   Wt 53.2 kg (117 lb 3.2 oz)   SpO2 97%   BMI 22.71 kg/m²     GENERAL:  Laying in bed, awake  HEAD: Normocephalic, atraumatic  LUNGS:  No increased work of breathing  CARDIOVASCULAR:  RR  ABDOMEN:  non-tender, slightly firm, mild distended  EXTREMITIES: No edema or swelling  SKIN: Warm and dry    Lab Results   Component Value Date/Time    WBC 6.9 04/06/2024 02:51 AM    HGB 10.2 (L) 04/06/2024 02:51 AM     04/06/2024 02:51 AM        Assessment/Plan:  83 y.o. female with severe SBO, pneumatosis.  She is passing flatus.  She is tolerating diet.  Both her today and her brother per his previous discussions with the surgical service have declined surgical intervention.    Continue antibiotics. Diet per her request.    Electronically signed by Maurisio Castillo MD on 4/6/2024 at 8:59 AM

## 2024-04-06 NOTE — PROGRESS NOTES
P Quality Flow/Interdisciplinary Rounds Progress Note        Quality Flow Rounds held on April 6, 2024    Disciplines Attending:  Bedside Nurse    Haleigh RACHEL Regis was admitted on 4/1/2024 10:08 PM    Anticipated Discharge Date:       Disposition:    Landon Score:  Landon Scale Score: 20    Readmission Risk              Risk of Unplanned Readmission:  11           Discussed patient goal for the day, patient clinical progression, and barriers to discharge.  The following Goal(s) of the Day/Commitment(s) have been identified:   Await Pre-Cert, IV Abx        Anette Hale RN  April 6, 2024

## 2024-04-07 LAB
ANION GAP SERPL CALCULATED.3IONS-SCNC: 7 MMOL/L (ref 7–16)
BASOPHILS # BLD: 0.07 K/UL (ref 0–0.2)
BASOPHILS NFR BLD: 1 % (ref 0–2)
BUN SERPL-MCNC: 13 MG/DL (ref 6–23)
CALCIUM SERPL-MCNC: 8.3 MG/DL (ref 8.6–10.2)
CHLORIDE SERPL-SCNC: 106 MMOL/L (ref 98–107)
CK SERPL-CCNC: 1474 U/L (ref 20–180)
CO2 SERPL-SCNC: 26 MMOL/L (ref 22–29)
CREAT SERPL-MCNC: 0.6 MG/DL (ref 0.5–1)
EOSINOPHIL # BLD: 0.43 K/UL (ref 0.05–0.5)
EOSINOPHILS RELATIVE PERCENT: 6 % (ref 0–6)
ERYTHROCYTE [DISTWIDTH] IN BLOOD BY AUTOMATED COUNT: 14 % (ref 11.5–15)
GFR SERPL CREATININE-BSD FRML MDRD: >90 ML/MIN/1.73M2
GLUCOSE SERPL-MCNC: 83 MG/DL (ref 74–99)
HCT VFR BLD AUTO: 32.6 % (ref 34–48)
HGB BLD-MCNC: 10.1 G/DL (ref 11.5–15.5)
IMM GRANULOCYTES # BLD AUTO: <0.03 K/UL (ref 0–0.58)
IMM GRANULOCYTES NFR BLD: 0 % (ref 0–5)
LYMPHOCYTES NFR BLD: 2.97 K/UL (ref 1.5–4)
LYMPHOCYTES RELATIVE PERCENT: 40 % (ref 20–42)
MCH RBC QN AUTO: 28.9 PG (ref 26–35)
MCHC RBC AUTO-ENTMCNC: 31 G/DL (ref 32–34.5)
MCV RBC AUTO: 93.4 FL (ref 80–99.9)
MONOCYTES NFR BLD: 0.76 K/UL (ref 0.1–0.95)
MONOCYTES NFR BLD: 10 % (ref 2–12)
NEUTROPHILS NFR BLD: 43 % (ref 43–80)
NEUTS SEG NFR BLD: 3.15 K/UL (ref 1.8–7.3)
PLATELET # BLD AUTO: 273 K/UL (ref 130–450)
PMV BLD AUTO: 10.1 FL (ref 7–12)
POTASSIUM SERPL-SCNC: 3.6 MMOL/L (ref 3.5–5)
RBC # BLD AUTO: 3.49 M/UL (ref 3.5–5.5)
SODIUM SERPL-SCNC: 139 MMOL/L (ref 132–146)
WBC OTHER # BLD: 7.4 K/UL (ref 4.5–11.5)

## 2024-04-07 PROCEDURE — 6360000002 HC RX W HCPCS: Performed by: STUDENT IN AN ORGANIZED HEALTH CARE EDUCATION/TRAINING PROGRAM

## 2024-04-07 PROCEDURE — 93005 ELECTROCARDIOGRAM TRACING: CPT | Performed by: INTERNAL MEDICINE

## 2024-04-07 PROCEDURE — 6370000000 HC RX 637 (ALT 250 FOR IP): Performed by: GENERAL PRACTICE

## 2024-04-07 PROCEDURE — 6370000000 HC RX 637 (ALT 250 FOR IP): Performed by: SURGERY

## 2024-04-07 PROCEDURE — 99232 SBSQ HOSP IP/OBS MODERATE 35: CPT | Performed by: SURGERY

## 2024-04-07 PROCEDURE — 80048 BASIC METABOLIC PNL TOTAL CA: CPT

## 2024-04-07 PROCEDURE — 82550 ASSAY OF CK (CPK): CPT

## 2024-04-07 PROCEDURE — 2060000000 HC ICU INTERMEDIATE R&B

## 2024-04-07 PROCEDURE — 85025 COMPLETE CBC W/AUTO DIFF WBC: CPT

## 2024-04-07 PROCEDURE — 2580000003 HC RX 258: Performed by: STUDENT IN AN ORGANIZED HEALTH CARE EDUCATION/TRAINING PROGRAM

## 2024-04-07 PROCEDURE — 2580000003 HC RX 258: Performed by: GENERAL PRACTICE

## 2024-04-07 RX ORDER — BISACODYL 10 MG
10 SUPPOSITORY, RECTAL RECTAL ONCE
Status: COMPLETED | OUTPATIENT
Start: 2024-04-07 | End: 2024-04-07

## 2024-04-07 RX ADMIN — PIPERACILLIN AND TAZOBACTAM 3375 MG: 3; .375 INJECTION, POWDER, LYOPHILIZED, FOR SOLUTION INTRAVENOUS at 09:15

## 2024-04-07 RX ADMIN — SODIUM CHLORIDE: 9 INJECTION, SOLUTION INTRAVENOUS at 02:20

## 2024-04-07 RX ADMIN — PIPERACILLIN AND TAZOBACTAM 3375 MG: 3; .375 INJECTION, POWDER, LYOPHILIZED, FOR SOLUTION INTRAVENOUS at 23:33

## 2024-04-07 RX ADMIN — PIPERACILLIN AND TAZOBACTAM 3375 MG: 3; .375 INJECTION, POWDER, LYOPHILIZED, FOR SOLUTION INTRAVENOUS at 16:48

## 2024-04-07 RX ADMIN — PIPERACILLIN AND TAZOBACTAM 3375 MG: 3; .375 INJECTION, POWDER, LYOPHILIZED, FOR SOLUTION INTRAVENOUS at 01:20

## 2024-04-07 RX ADMIN — BISACODYL 10 MG: 10 SUPPOSITORY RECTAL at 11:27

## 2024-04-07 RX ADMIN — SODIUM CHLORIDE: 9 INJECTION, SOLUTION INTRAVENOUS at 15:42

## 2024-04-07 RX ADMIN — DOCUSATE SODIUM 100 MG: 100 CAPSULE, LIQUID FILLED ORAL at 20:08

## 2024-04-07 RX ADMIN — DOCUSATE SODIUM 100 MG: 100 CAPSULE, LIQUID FILLED ORAL at 09:17

## 2024-04-07 ASSESSMENT — PAIN SCALES - GENERAL
PAINLEVEL_OUTOF10: 0

## 2024-04-07 NOTE — PROGRESS NOTES
GENERAL SURGERY  DAILY PROGRESS NOTE  4/7/2024  Chief Complaint   Patient presents with    Emesis       Subjective:    Denies abdominal pain.  Denies nausea or vomiting.  Passing flatus.  No BM    Objective:  BP (!) 104/50   Pulse 74   Temp 97.7 °F (36.5 °C) (Oral)   Resp 16   Ht 1.53 m (5' 0.24\")   Wt 53.2 kg (117 lb 3.2 oz)   SpO2 95%   BMI 22.71 kg/m²     GENERAL:  Laying in bed, awake  HEAD: Normocephalic, atraumatic  LUNGS:  No increased work of breathing  CARDIOVASCULAR:  RR  ABDOMEN:  non-tender, softer today, mild distended  EXTREMITIES: No edema or swelling  SKIN: Warm and dry    Lab Results   Component Value Date/Time    WBC 7.4 04/07/2024 02:50 AM    HGB 10.1 (L) 04/07/2024 02:50 AM     04/07/2024 02:50 AM        Assessment/Plan:  83 y.o. female with severe SBO, pneumatosis - likely secondary to distention.  She is passing flatus.  She is tolerating diet.  No BM. Try suppository today, continue colace. Surgical intervention has been declined.    Continue antibiotics. Diet per her request.    Electronically signed by Maurisio Castillo MD on 4/7/2024 at 9:24 AM

## 2024-04-07 NOTE — PROGRESS NOTES
notified of patient having frequent quadrigeminal dlk on CMR.Patient is alert and oriented x4.No signs or symptoms of chest pain or discomfort.

## 2024-04-07 NOTE — PROGRESS NOTES
PROGRESS NOTE       PATIENT PROBLEM LIST:  Patient Active Problem List   Diagnosis Code    SCC (squamous cell carcinoma) C44.92    Melanoma ([facility]) C43.9    Closed displaced fracture of right femoral neck ([facility]) S72.001A    Status post hip hemiarthroplasty Z96.649    Elevated troponin R79.89    Nausea and vomiting R11.2    SBO (small bowel obstruction) ([facility]) K56.609       SUBJECTIVE:  Haleigh Stacy states her abdominal discomfort has improved somewhat.  She denies any chest discomfort nor palpitations or lightheadedness.    REVIEW OF SYSTEMS:  General ROS: negative for - fatigue, malaise,  weight gain or weight loss  Psychological ROS: negative for - anxiety , depression  Ophthalmic ROS: negative for - decreased vision or visual distortion.  ENT ROS: negative  Allergy and Immunology ROS: negative  Hematological and Lymphatic ROS: negative  Endocrine: no heat or cold intolerance and no polyphagia, polydipsia, or polyuria  Respiratory ROS: for - hemoptysis, pleuritic pain, and wheezing  Cardiovascular ROS: positive for - dyspnea on exertion and irregular heartbeat.  Gastrointestinal ROS: no abdominal pain, change in bowel habits, or black or bloody stools  Genito-Urinary ROS: no nocturia, dysuria, trouble voiding, frequency or hematuria  Musculoskeletal ROS: negative for- myalgias, arthralgias, or claudication  Neurological ROS: no TIA or stroke symptoms otherwise no significant change in symptoms or problems since yesterday as documented in previous progress notes.    SCHEDULED MEDICATIONS:   docusate sodium  100 mg Oral BID    piperacillin-tazobactam  3,375 mg IntraVENous Q8H       VITAL SIGNS:                                                                                                                          BP (!) 104/48   Pulse 53   Temp 97.8 °F (36.6 °C) (Oral)   Resp 16   Ht 1.53 m (5' 0.24\")   Wt 53.2 kg (117 lb 3.2 oz)   SpO2 100%   BMI 22.71 kg/m²   Patient Vitals for the past 96 hrs (Last 3  Active Problem List   Diagnosis Code    SCC (squamous cell carcinoma) C44.92    Melanoma ([facility]) C43.9    Closed displaced fracture of right femoral neck ([facility]) S72.001A    Status post hip hemiarthroplasty Z96.649    Elevated troponin R79.89    Nausea and vomiting R11.2    SBO (small bowel obstruction) ([facility]) K56.609       RECOMMENDATIONS:  Continue to closely observe for further cardiac arrhythmia and adjust medications accordingly.  Continue to monitor electrolytes as well.    I have spent more than 25 minutes face to face with Haleigh Stacy and reviewing notes and laboratory data, with greater than 50% of this time instructing and counseling the patient face to face regarding my findings and recommendations and I have answered all questions as posed to me by Ms. Stacy.    Tariq Reyes, DO FACP,FACC,[facility]      NOTE:  This report was transcribed using voice recognition software.  Every effort was made to ensure accuracy; however, inadvertent computerized transcription errors may be present

## 2024-04-07 NOTE — PROGRESS NOTES
P Quality Flow/Interdisciplinary Rounds Progress Note        Quality Flow Rounds held on April 7, 2024    Disciplines Attending:  Bedside Nurse    Haleigh RACHEL Regis was admitted on 4/1/2024 10:08 PM    Anticipated Discharge Date:       Disposition:    Landon Score:  Landon Scale Score: 20    Readmission Risk              Risk of Unplanned Readmission:  11           Discussed patient goal for the day, patient clinical progression, and barriers to discharge.  The following Goal(s) of the Day/Commitment(s) have been identified:   IV Abx, await pre-cert       Anette Hale RN  April 7, 2024

## 2024-04-07 NOTE — PROGRESS NOTES
PROGRESS NOTE       PATIENT PROBLEM LIST:  Patient Active Problem List   Diagnosis Code    SCC (squamous cell carcinoma) C44.92    Melanoma ([facility]) C43.9    Closed displaced fracture of right femoral neck ([facility]) S72.001A    Status post hip hemiarthroplasty Z96.649    Elevated troponin R79.89    Nausea and vomiting R11.2    SBO (small bowel obstruction) ([facility]) K56.609       SUBJECTIVE:  Haleigh Stacy states her abdominal discomfort has improved somewhat.  She denies any chest discomfort nor palpitations or lightheadedness.    REVIEW OF SYSTEMS:  General ROS: negative for - fatigue, malaise,  weight gain or weight loss  Psychological ROS: negative for - anxiety , depression  Ophthalmic ROS: negative for - decreased vision or visual distortion.  ENT ROS: negative  Allergy and Immunology ROS: negative  Hematological and Lymphatic ROS: negative  Endocrine: no heat or cold intolerance and no polyphagia, polydipsia, or polyuria  Respiratory ROS: for - hemoptysis, pleuritic pain, and wheezing  Cardiovascular ROS: positive for - dyspnea on exertion and irregular heartbeat.  Gastrointestinal ROS: no abdominal pain, change in bowel habits, or black or bloody stools  Genito-Urinary ROS: no nocturia, dysuria, trouble voiding, frequency or hematuria  Musculoskeletal ROS: negative for- myalgias, arthralgias, or claudication  Neurological ROS: no TIA or stroke symptoms otherwise no significant change in symptoms or problems since yesterday as documented in previous progress notes.    SCHEDULED MEDICATIONS:   docusate sodium  100 mg Oral BID    piperacillin-tazobactam  3,375 mg IntraVENous Q8H       VITAL SIGNS:                                                                                                                          BP (!) 104/48   Pulse 53   Temp 97.8 °F (36.6 °C) (Oral)   Resp 16   Ht 1.53 m (5' 0.24\")   Wt 53.2 kg (117 lb 3.2 oz)   SpO2 100%   BMI 22.71 kg/m²   Patient Vitals for the past 96 hrs (Last 3

## 2024-04-07 NOTE — PROGRESS NOTES
PROGRESS NOTE       PATIENT PROBLEM LIST:  Patient Active Problem List   Diagnosis Code    SCC (squamous cell carcinoma) C44.92    Melanoma ([facility]) C43.9    Closed displaced fracture of right femoral neck ([facility]) S72.001A    Status post hip hemiarthroplasty Z96.649    Elevated troponin R79.89    Nausea and vomiting R11.2    SBO (small bowel obstruction) ([facility]) K56.609       SUBJECTIVE:  Haleigh Stacy states she feels the same and is trying to eat.  She denies any chest discomfort presently nor shortness of breath.  Had ventricular quadrigeminy this morning.    REVIEW OF SYSTEMS:  General ROS: negative for - fatigue, malaise,  weight gain or weight loss  Psychological ROS: negative for - anxiety , depression  Ophthalmic ROS: negative for - decreased vision or visual distortion.  ENT ROS: negative  Allergy and Immunology ROS: negative  Hematological and Lymphatic ROS: negative  Endocrine: no heat or cold intolerance and no polyphagia, polydipsia, or polyuria  Respiratory ROS: negative for - hemoptysis, pleuritic pain, and wheezing  Cardiovascular ROS: positive for - dyspnea on exertion and irregular heartbeat.  Gastrointestinal ROS: no abdominal pain, change in bowel habits, or black or bloody stools  Genito-Urinary ROS: no nocturia, dysuria, trouble voiding, frequency or hematuria  Musculoskeletal ROS: negative for- myalgias, arthralgias, or claudication  Neurological ROS: no TIA or stroke symptoms otherwise no significant change in symptoms or problems since yesterday as documented in previous progress notes.    SCHEDULED MEDICATIONS:   docusate sodium  100 mg Oral BID    piperacillin-tazobactam  3,375 mg IntraVENous Q8H       VITAL SIGNS:                                                                                                                          BP (!) 104/48   Pulse 53   Temp 97.8 °F (36.6 °C) (Oral)   Resp 16   Ht 1.53 m (5' 0.24\")   Wt 53.2 kg (117 lb 3.2 oz)   SpO2 100%   BMI 22.71 kg/m²  Profile: No results found for: \"TRIG\", \"HDL\", \"LDLCALC\", \"CHOL\"   Hemoglobin A1C: No components found for: \"HGBA1C\"      RAD:   No results found.      EKG: See Report  Echo: 4/4/2024  Echo Findings    Left Ventricle Normal left ventricular systolic function with a visually estimated EF of 60 - 65%. Left ventricle size is normal. Normal wall thickness. Normal wall motion. Normal diastolic function. Elevated left ventricular filling pressure.  Estimated wedge pressure 17 mmHg as per [place] formula.   Left Atrium Left atrium is mildly dilated. Left atrial volume index is moderately increased (42-48 mL/m2).   Right Ventricle Right ventricle is mildly dilated. Normal wall thickness. Normal systolic function. TAPSE is normal. Normal wall motion.   Right Atrium Right atrium size is normal.   Aortic Valve Valve structure is normal. No restricted motion. No regurgitation. No stenosis.   Mitral Valve Mildly calcified leaflet, at the anterior leaflet. Mild annular calcification at the posterior leaflet of the mitral valve. Trace regurgitation.   Tricuspid Valve Valve structure is normal. Mild regurgitation.   Pulmonic Valve Valve structure is normal. Physiologically normal regurgitation.   Aorta Normal sized sinus of Valsalva and ascending aorta. Grade 1, calcified atherosclerosis of the sinus of Valsalva.   IVC/Hepatic Veins IVC diameter is less than or equal to 21 mm and decreases greater than 50% during inspiration; therefore the estimated right atrial pressure is normal (~3 mmHg). IVC size is normal.   Pericardium The pericardium is normal. No pericardial effusion.       IMPRESSIONS:  Patient Active Problem List   Diagnosis Code    SCC (squamous cell carcinoma) C44.92    Melanoma ([facility]) C43.9    Closed displaced fracture of right femoral neck ([facility]) S72.001A    Status post hip hemiarthroplasty Z96.649    Elevated troponin R79.89    Nausea and vomiting R11.2    SBO (small bowel obstruction) ([facility]) K56.609

## 2024-04-07 NOTE — PROGRESS NOTES
PROGRESS NOTE       PATIENT PROBLEM LIST:  Patient Active Problem List   Diagnosis Code    SCC (squamous cell carcinoma) C44.92    Melanoma ([facility]) C43.9    Closed displaced fracture of right femoral neck ([facility]) S72.001A    Status post hip hemiarthroplasty Z96.649    Elevated troponin R79.89    Nausea and vomiting R11.2    SBO (small bowel obstruction) ([facility]) K56.609       SUBJECTIVE:  Haleigh Stacy states her abdominal discomfort has improved somewhat.  She denies any chest discomfort nor palpitations or lightheadedness.    REVIEW OF SYSTEMS:  General ROS: negative for - fatigue, malaise,  weight gain or weight loss  Psychological ROS: negative for - anxiety , depression  Ophthalmic ROS: negative for - decreased vision or visual distortion.  ENT ROS: negative  Allergy and Immunology ROS: negative  Hematological and Lymphatic ROS: negative  Endocrine: no heat or cold intolerance and no polyphagia, polydipsia, or polyuria  Respiratory ROS: for - hemoptysis, pleuritic pain, and wheezing  Cardiovascular ROS: positive for - dyspnea on exertion and irregular heartbeat.  Gastrointestinal ROS: no abdominal pain, change in bowel habits, or black or bloody stools  Genito-Urinary ROS: no nocturia, dysuria, trouble voiding, frequency or hematuria  Musculoskeletal ROS: negative for- myalgias, arthralgias, or claudication  Neurological ROS: no TIA or stroke symptoms otherwise no significant change in symptoms or problems since yesterday as documented in previous progress notes.    SCHEDULED MEDICATIONS:   docusate sodium  100 mg Oral BID    piperacillin-tazobactam  3,375 mg IntraVENous Q8H       VITAL SIGNS:                                                                                                                          BP (!) 104/48   Pulse 53   Temp 97.8 °F (36.6 °C) (Oral)   Resp 16   Ht 1.53 m (5' 0.24\")   Wt 53.2 kg (117 lb 3.2 oz)   SpO2 100%   BMI 22.71 kg/m²       OBJECTIVE:    HEENT: PERRL, EOM  Intact;

## 2024-04-08 ENCOUNTER — APPOINTMENT (OUTPATIENT)
Dept: GENERAL RADIOLOGY | Age: 84
DRG: 389 | End: 2024-04-08
Payer: COMMERCIAL

## 2024-04-08 VITALS
OXYGEN SATURATION: 95 % | WEIGHT: 117.2 LBS | BODY MASS INDEX: 23.01 KG/M2 | RESPIRATION RATE: 18 BRPM | TEMPERATURE: 98.7 F | DIASTOLIC BLOOD PRESSURE: 52 MMHG | HEART RATE: 76 BPM | SYSTOLIC BLOOD PRESSURE: 108 MMHG | HEIGHT: 60 IN

## 2024-04-08 LAB — TROPONIN I SERPL HS-MCNC: 767 NG/L (ref 0–9)

## 2024-04-08 PROCEDURE — 84484 ASSAY OF TROPONIN QUANT: CPT

## 2024-04-08 PROCEDURE — 6360000002 HC RX W HCPCS: Performed by: STUDENT IN AN ORGANIZED HEALTH CARE EDUCATION/TRAINING PROGRAM

## 2024-04-08 PROCEDURE — 6370000000 HC RX 637 (ALT 250 FOR IP): Performed by: GENERAL PRACTICE

## 2024-04-08 PROCEDURE — 74018 RADEX ABDOMEN 1 VIEW: CPT

## 2024-04-08 PROCEDURE — 99231 SBSQ HOSP IP/OBS SF/LOW 25: CPT | Performed by: NURSE PRACTITIONER

## 2024-04-08 PROCEDURE — 2580000003 HC RX 258: Performed by: STUDENT IN AN ORGANIZED HEALTH CARE EDUCATION/TRAINING PROGRAM

## 2024-04-08 RX ORDER — METRONIDAZOLE 500 MG/1
500 TABLET ORAL 3 TIMES DAILY
Qty: 42 TABLET | Refills: 0
Start: 2024-04-08 | End: 2024-04-22

## 2024-04-08 RX ORDER — PSEUDOEPHEDRINE HCL 30 MG
100 TABLET ORAL 2 TIMES DAILY
Qty: 30 CAPSULE | Refills: 0
Start: 2024-04-08

## 2024-04-08 RX ADMIN — PIPERACILLIN AND TAZOBACTAM 3375 MG: 3; .375 INJECTION, POWDER, LYOPHILIZED, FOR SOLUTION INTRAVENOUS at 08:50

## 2024-04-08 RX ADMIN — DOCUSATE SODIUM 100 MG: 100 CAPSULE, LIQUID FILLED ORAL at 08:49

## 2024-04-08 ASSESSMENT — PAIN SCALES - GENERAL
PAINLEVEL_OUTOF10: 0
PAINLEVEL_OUTOF10: 0

## 2024-04-08 NOTE — CARE COORDINATION
Social Work / Discharge Planning : Message left with Loretta from [facility] in regards to status of pre-cert for [facility]. Await pre-cert. DR Coyne updated on plan [facility] at D/C as well as her brother POA . SW to follow. Electronically signed by JEANMARIE Thompson on 4/8/24 at 10:22 AM EDT     Addendum: Loretta from [facility] ( [phone]) did get Auth. Please contact Loretta when ready for discharge. They maybe able to  patient, if not, Physicians. SW to follow. Electronically signed by JEANMARIE Thompson on 4/8/24 at 12:44 PM EDT     Addendum: Patient to be transported to [facility] today 4:00 via [facility]lette. Patient, Loretta from  and brother updated as well as RN. AMI to follow. Electronically signed by JEANMARIE Thompson on 4/8/24 at 2:03 PM EDT

## 2024-04-08 NOTE — PLAN OF CARE
Problem: ABCDS Injury Assessment  Goal: Absence of physical injury  4/8/2024 1433 by Betsy Yeung RN  Outcome: Adequate for Discharge  4/8/2024 0401 by Faye Degroot RN  Outcome: Progressing     Problem: Discharge Planning  Goal: Discharge to home or other facility with appropriate resources  Outcome: Adequate for Discharge     Problem: Skin/Tissue Integrity  Goal: Absence of new skin breakdown  Description: 1.  Monitor for areas of redness and/or skin breakdown  2.  Assess vascular access sites hourly  3.  Every 4-6 hours minimum:  Change oxygen saturation probe site  4.  Every 4-6 hours:  If on nasal continuous positive airway pressure, respiratory therapy assess nares and determine need for appliance change or resting period.  4/8/2024 1433 by Betsy Yeung RN  Outcome: Adequate for Discharge  4/8/2024 0401 by Faye Degroot, RN  Outcome: Progressing     Problem: Pain  Goal: Verbalizes/displays adequate comfort level or baseline comfort level  Outcome: Adequate for Discharge     Problem: Safety - Adult  Goal: Free from fall injury  Outcome: Adequate for Discharge

## 2024-04-08 NOTE — PROGRESS NOTES
Palliative Care Department  [phone]  Palliative Care Initial Consult  Provider LO Tobar CNP      PATIENT: Haleigh Stacy  : 1940  MRN: [id number]  ADMISSION DATE: 2024 10:08 PM  Referring Provider: Mike Main DO     Palliative Medicine was consulted on hospital day 4 for assistance with Goals of care    HPI:     Clinical Summary:Haleigh Stacy is a 83 y.o. y/o female with a history of hyperlipidemia, hysterectomy, melanoma, hip fracture who presented to [facility] on 2024 with nausea.  CT of the abdomen showed Severely dilated small bowel with pneumatosis of small bowel which is concerning for ischemia.  General surgery is following and recommended conservative management with NG tube and antibiotics.  The patient pulled out her NG tube and has refusing to have it replaced or have further interventions or workup.  Palliative care was consulted for goals of care discussion.    ASSESSMENT/PLAN:     Pertinent Hospital Diagnoses     Small bowel obstruction  Nausea    Palliative Care Encounter / Counseling Regarding Goals of Care  Please see detailed goals of care discussion as below  At this time, Haleigh Stacy, Does have capacity for medical decision-making.  Capacity is time limited and situation/question specific  During encounter Miles was surrogate medical decision-maker  Outcome of goals of care meeting:  Continue supportive medical management with antibiotics and IV fluids  CODE STATUS changed to DNR CC  Consult placed to HOTV  Code status DNR-CC  Advanced Directives: no POA or living will in epic  Surrogate/Legal NOK:  Miles Stacy (brother) [phone]    Spiritual assessment: no spiritual distress identified  Bereavement and grief: to be determined  Referrals to: none today    Thank you for the opportunity to participate in the care of Haleigh Stacy.     LO Tobar CNP   Palliative Medicine     SUBJECTIVE:     Details of Conversation: Chart reviewed and met

## 2024-04-08 NOTE — ACP (ADVANCE CARE PLANNING)
Advance Care Planning   Healthcare Decision Maker:    Miles Stacy Brother/Sister [phone]     sofia stacy Brother/Sister [phone]       Click here to complete Healthcare Decision Makers including selection of the Healthcare Decision Maker Relationship (ie \"Primary\").  Today we documented Decision Maker(s) consistent with Legal Next of Kin hierarchy.       Miles Stacy Brother/Sister [phone]     sofia stacy Brother/Sister [phone]

## 2024-04-08 NOTE — PROGRESS NOTES
Less abdominal pain . Tolerating diet. No vomiting. Check kub. Troponin and cpk still elevated . Likely ischemia in bowel, refuses surgery

## 2024-04-08 NOTE — CONSULTS
Podiatry Consult H&P  4/8/2024   Haleigh RACHEL Regis     HISTORY OF PRESENT ILLNESS: This is a 83 y.o. female seen bedside for nailcare. Patient states her nails become long, thick, and are discolored. Patient denies n,v,f,c,d at this time. Patient has no other pedal complaints.        Past Medical History:   Diagnosis Date    Hyperlipidemia     Nausea and vomiting 4/4/2024    SCC (squamous cell carcinoma) 3/7/2011        Past Surgical History:   Procedure Laterality Date    BREAST SURGERY      pt unknown what breast lump removed    HIP SURGERY Right 9/6/2020    HIP HEMIARTHROPLASTY performed by Souleymane Olmedo MD at [facility] OR    HYSTERECTOMY (CERVIX STATUS UNKNOWN)  2005    pt unsure of med hx    SKIN BIOPSY           Family History   Problem Relation Age of Onset    Cancer Father         Social History     Tobacco Use    Smoking status: Never    Smokeless tobacco: Never   Substance Use Topics    Alcohol use: No        Prior to Admission medications    Not on File        Patient has no known allergies.         OBJECTIVE:        Vitals:    04/08/24 0745   BP: (!) 114/57   Pulse: 83   Resp: 18   Temp: 98.8 °F (37.1 °C)   SpO2: 95%              EXAM:        Pt is AAOx3, NAD    Vascular Exam:  DP and PT pulses are palpable. CFT <5 seconds to digits. Skin temp is warm to warm from proximal to distal.    Neuro Exam:  Light touch sensation is intact    Dermatologic Exam:  Nails 1-5 b/l are thickened, discolored yellow with subungual debris but within normal limits for length. No open wounds, lacerations noted. Webspaces 1-4 are clean, dry, intact.    MSK: Deferred    Current Facility-Administered Medications   Medication Dose Route Frequency Provider Last Rate Last Admin    0.9 % sodium chloride infusion   IntraVENous Continuous Diogo Coyne  mL/hr at 04/07/24 1542 New Bag at 04/07/24 1542    docusate sodium (COLACE) capsule 100 mg  100 mg Oral BID Diogo Coyne DO   100 mg at 04/08/24 0849

## 2024-04-11 LAB
EKG ATRIAL RATE: 70 BPM
EKG P AXIS: 51 DEGREES
EKG P-R INTERVAL: 176 MS
EKG Q-T INTERVAL: 462 MS
EKG QRS DURATION: 104 MS
EKG QTC CALCULATION (BAZETT): 498 MS
EKG R AXIS: 23 DEGREES
EKG T AXIS: -59 DEGREES
EKG VENTRICULAR RATE: 70 BPM

## 2024-05-02 PROBLEM — R79.89 ELEVATED TROPONIN: Status: RESOLVED | Noted: 2024-04-02 | Resolved: 2024-05-02

## 2024-05-18 NOTE — DISCHARGE SUMMARY
was able to be switched over from her IV antibiotics to Flagyl.  Because of the rhabdo and elevated CPK enzymes we discontinued her stain.  She is very anxious to go home and we are going to send her back to her assisted living facility, and follow her up in the office on an as-needed basis, get her ready to go over labs and monitor her signs and symptoms of her illness while in the hospital and going forward if anything further needs to be done.      Again, at the time of discharge, her condition was improved.  Her prognosis is guarded.          ANTHONY CANO DO      D:  05/17/2024 11:22:17     T:  05/18/2024 00:28:57     CLAU/ALEXANDER  Job #:  [id number]     Doc#:  [id number]

## 2024-05-29 NOTE — PROGRESS NOTES
Physician Progress Note      PATIENT:               CALISTA LAYNE  CSN #:                  [id number]  :                       1940  ADMIT DATE:       2024 10:08 PM  DISCH DATE:        2024 4:55 PM  RESPONDING  PROVIDER #:        ALEC LEON          QUERY TEXT:    Patient admitted with partial small bowel obstruction.  Noted documentation of   \"Likely ischemia in bowel, refuses surgery\" in the  IM PN and DC Summary   documentation of \"annot rule out ischemic bowel disease.\"  Please document in   progress notes and discharge summary if you are evaluating and/or treating any   of the following:    The medical record reflects the following:  Risk Factors: partial small bowel obstruction, Rhabdomyolysis  Clinical Indicators: CK 1,881; \"Likely ischemia in bowel, refuses surgery\" in   the  IM PN and DC Summary documentation of \"cannot rule out ischemic bowel   disease.\"  Treatment: Surgery consult, NG for decompression, IVFB 1L, pt refusing surgery   & pulled out NG; CT Abd, labs and monitoring    Thank you,  Rose Marie Oglesby   Clinical Documentation Improvement Specialist  W: [phone]  Options provided:  -- Acute diffuse ischemia of small intestine  -- Acute focal ischemia of small intestine  -- Chronic bowel ischemia  -- Other - I will add my own diagnosis  -- Disagree - Not applicable / Not valid  -- Disagree - Clinically unable to determine / Unknown  -- Refer to Clinical Documentation Reviewer    PROVIDER RESPONSE TEXT:    Provider is clinically unable to determine a response to this query.    Query created by: Rose Marie Oglesby on 2024 7:47 AM      Electronically signed by:  ALEC LEON 2024 8:53 AM

## 2025-05-01 NOTE — OP NOTE
[facility]  [address] [place] [address]    [id number], [id number]    Ledy Hui  [address] [place] [address]    May 1, 2025    Member Name: Zena Hui   Member Number: [id number]   Reference Number: [id number]   Approved Services: Echos and EKG   Approved Service Dates: 05/01/2025 - 08/29/2025   Requesting Provider: Jaswant Ridley   Requested Provider: [facility]     Dear Zena Radha Ez:    The following medical service(s) requested by Jaswant Ridley have been approved:    Procedure Code Procedure Code Name Requested Quantity Approved Quantity Status   48852 (CPT®) [place] ECHO HEART XTHORACIC,COMPLETE W DOPPLER 1 1 Authorized       Approved Quantity means the number of visits approved for medication treatments and/or medical services.    The services should be provided by [facility] no later than 08/29/2025. Please contact the provider listed below with any questions.     Provider Information:  [facility]  [phone]    Your plan benefit may require a deductible, co-payment or coinsurance for these services. This authorization does not guarantee [facility] hospital will pay the claim for services that you receive. Payment by [facility] hospital for these services is subject to the terms of your Evidence of Coverage, your eligibility at the time of service, and confirmation of benefit coverage.    For any questions or additional information, please contact Customer Service:    [facility] Toll Free: [phone]  TTY users dial: 711   Call Center Hours:  Oct 1 - Mar 31, Mon - Fri 7 AM to 8 PM PST  Oct 1 - Mar 31, Sat - Sun 8 AM to 8 PM PST  Apr 1 - Sep 30, Mon - Fri 7 AM to 8 PM PST   Office Hours: Mon - Fri 8 AM to 5 PM PST   E-mail: [email or web address]   Website:  [email or web address]      This information is available for free in other languages. Please contact  Operative Note      Patient: Christo Payne  YOB: 1940  MRN: [id number]    Date of Procedure: 9/6/2020    Pre-Op Diagnosis: Right displaced femoral neck fracture    Post-Op Diagnosis: Same         Procedure: Right hip hemiarthroplasty for femoral neck fracture      Surgeon(s):  Jackie Shaw MD    Assistant:   Resident: Shama Hernnadez DO    Anesthesia: General    Estimated Blood Loss (mL): less than 989     Complications: None    Specimens:   ID Type Source Tests Collected by Time Destination   A : RIGHT FEMORAL HEAD Bone Bone SURGICAL PATHOLOGY Jackie Shaw MD 9/6/2020 1855        Implants:  Implant Name Type Inv. Item Serial No.  Lot No. LRB No. Used Action   IMPL HIP FEM STEM [id number] SZ 4 Hip IMPL HIP FEM STEM [id number] SZ 4  SAL: ORTHOPAED[employer] [id number] Right 1 Implanted   IMPL HIP FEM HEAD TAPR VITALLIUM 3MM Hip IMPL HIP FEM HEAD TAPR VITALLIUM 3MM  STRYKERBarrett Vernal [id number] Right 1 Implanted   IMPL HIP FEM HEAD BIPLR UHR UNIV [id number] Hip IMPL HIP FEM HEAD BIPLR UHR UNIV [id number]  SAL: ORTHOPAEDICS [id number] Right 1 Implanted         Drains:   Urethral Catheter (Active)   $ Urethral catheter insertion Inserted for procedure 09/06/20 1146   Catheter Indications Perioperative use in selected surgeries including but not limited to urologic, pelvic or need for intraoperative monitoring of urinary output due to prolonged surgery, large volume infusion or need for diuretic therapy in surgery 09/06/20 1146   Site Assessment No urethral drainage 09/06/20 1146   Urine Color Yellow 09/06/20 1600   Urine Appearance Cloudy 09/06/20 1146   Output (mL) 350 mL 09/06/20 1529       Findings: Stable hip, patient had tight quadriceps bilaterally most likely due to limited ambulation    Detailed Description of Procedure:     Implants: Size #4 Seibert HF X stem, 45 mm head, -3 mm neck      Operative Course: Outside the operating suite, the patient was seen and identified. Customer Service at the phone number above for more information. [facility] complies with applicable Federal civil rights laws and does not discriminate on the basis of race, color, national origin, age, disability or sex.    Sincerely,     Healthcare Utilization Management Department     Cc: [facility]   Jaswant Ridley    Multi-Language Insert  Multi- Services  English: We have free  services to answer any questions you may have about our health or drug plan.  To get an , just call us at [phone].  Someone who speaks English/Language can help you.  This is a free service.  Luxembourgish: Tenemos servicios de intérprete sin costo alguno  para responder cualquier pregunta que pueda tener sobre nuestro plan de praneeth o medicamentos. Para hablar con un intérprete, por favor llame al [phone]. Alguien que hable español le podrá ayudar. Petra es un servicio gratuito.  Chinese Mandarin: ?????????????????????????????? ???????????????? [phone]????????????????? ?????????  Chinese Cantonese: ?????????????????????????????? ????????????? [phone]???????????????????? ????????  Tagalog:  Mayroon kaming libreng serbisyo sa barnardsasaldiana mead o panggamot.  saud Ludwig  [phone]. Maaari didi Owens.  Ankit cao.  Guamanian:  Nous proposons bertha services gratuits d'interprétation pour répondre à toutes shannon questions relatives à notre régime de santé ou d'assurance-médicaments. Pour accéder au service d'interprétation, il vous suffit de nous appeler au [phone]. Un interlocuteur parMidwest Orthopedic Specialty Hospitalescobar Français pourra vous aider. Ce service est gratuit.  Japanese:  Reese pettit có d?ch v? thông d?ch mi?n phí ð? tr? l?i các câu h?i v? chýõng s?c kh?e và chýõng trìn thu?c men.  The operative site was marked and identified as appropriate by the patient, staff, surgeon, and Anesthesia. The patient was taken into the operating room, placed on the operative table, and placed under the appropriate amount of sedation under the care of the anesthesia team. The patient was placed into a lateral decubitus position. All bony prominences and neurovascular structures were well padded and protected. The operative site was then prepped and draped in a standard sterile fashion. An incision was made over the lateral trochanteric region and carried down to the level of the fascia jersey maintaining appropriate hemostasis with electrocautery. A small rent was placed in the fascia jersey. Grier scissors were used to extend the fascia jersey incision in line with its fibers to the length of the skin incision. A Charnley retractor was then placed in the anterior and posterior margin of the tensor fascia jersey incision, taking care not to violate any neurovascular structures. The anterior one-third of the gluteus medius tendon was identified. In line with its fibers, it was reflected using electrocautery off its trochanteric insertion. The gluteus medius and minimus tendons were reflected anteriorly down to the level of the hip capsule. The hip capsule was T'd up to the acetabulum and around the posterior medial and inferior aspects of the hip. The fracture was visualized. An oscillating saw was then used to make the femoral neck cut at a 45-degree angle, 1 cm proximal to the lesser trochanter. After this was done, a corkscrew was then placed into the femoral head. The femoral head was levered out of the acetabulum, and taken for sizing. At this point, the acetabulum was then inspected, removing all bony fragments and interposed tissue. A Woody retractor was placed under the femoral neck cut. A box osteotome was introduced into the femoral neck which was removed.  Then a T-handle canal finder was then introduced and N?u quí v? c?n thông d?ch viên bessie g?i [phone] s? có nhân viên nói ti?ng Vi?t giúp ð? quí v?. Ðây là d?ch v? mi?n phí .  Belizean:  Unser kostenser Dolmetscherservice beantwortet Ihren Fragen zu unserem Gesundheits- und Arzneimittelplan. Unsere Dolmetscher erreichen Sie [phone]. Man wird Ihnen sarah auf [facility]. Dieser Service ist [facility].  Khmer:  ??? ?? ?? ?? ?? ??? ?? ??? ?? ???? ?? ?? ???? ???? ????. ?? ???? ????? ?? [phone] ??? ??? ????.  ???? ?? ???? ?? ?? ????. ? ???? ??? ?????.   Chadian: Åñëè ó âàñ âîçíèêíóò âîïðîñû îòíîñèòåëüíî ñòðàõîâîãî èëè ìåäèêàìåíòíîãî ïëàíà, âû ìîæåòå âîñïîëüçîâàòüñÿ íàøèìè áåñïëàòíûìè óñëóãàìè ïåðåâîä÷èêîâ. ×òîáû âîñïîëüçîâàòüñÿ óñëóãàìè ïåðåâîä÷èêà, ïîçâîíèòå íàì ïî òåëåôîíó [phone]. Âàì îêàæåò ïîìîùü ñîòðóäíèê, êîòîðûé ãîâîðèò ïî-póññêè. Äàííàÿ óñëóãà áåñïëàòíàÿ.  Croatian: ÅääÇ äÞÏã ÎÏãÇÊ ÇáãÊÑÌã ÇáÝæÑí ÇáãÌÇäíÉ ááÅÌÇÈÉ Úä Ãí ÃÓÆáÉ ÊÊÚáÞ ÈÇáÕÍÉ Ãæ ÌÏæá ÇáÃÏæíÉ áÏíäÇ. ááÍÕæá Úáì ãÊÑÌã ÝæÑí¡ áíÓ Úáíß Óæì ÇáÇÊÕÇá ÈäÇ Úáì [phone] . ÓíÞæã ÔÎÕ ãÇ íÊÍÏË ÇáÚÑÈíÉ ÈãÓÇÚÏÊß. åÐå ÎÏãÉ ãÌÇäíÉ.  Michelle: ????? ????????? ?? ??? ?? ????? ?? ???? ??? ???? ???? ?? ?????? ?? ???? ???? ?? ??? ????? ??? ????? ???????? ?????? ?????? ???. ?? ???????? ??????? ???? ?? ???, ?? ???? [phone] ?? ??? ????. ??? ??????? ?? ?????? ????? ?? ???? ??? ?? ???? ??. ?? ?? ????? ???? ??.   Sao Tomean:  È disponibile un servizio di interpretariato gratuito per rispondere a eventuali domande sul nostro piano sanitario e farmaceutico. Per un interprete, contattare il tequila [phone]. Un nostro incaricato haroon parla Italianovi fornirà l'assistenza necessaria. È un servizio gratuito.  Portugués:  Dispomos de serviços de interpretação gratuitos para responder a qualquer questão que tenha acerca do nosso plano de saúde ou de medicação. Para obter um intérprete, contacte-nos através do número [phone]. Irá encontrar alguém que fale o idioma  Português para o ajudar.  removed. Broaching began at a size 0 and was carried up sequentially to an appropriately sized broach, where we found appropriate stability, axially and rotationally. The broach was then removed. The corresponding sized femoral component was then impacted in the appropriate position, which was found to have appropriate stable fit. An appropriately sized femoral head trial was then placed. The hip was reduced, taken through a range of motion, and was found to be appropriately stable. The hip was then dislocated. The femoral head trial component was then removed. The bipolar component was impacted onto the femoral stem into the appropriate position. The hip was then reduced, taken through a range of motion, and was found to be appropriately stable. Copious irrigation was performed of the joint. Platelet-rich plasma gel was then injected into the joint. Also, #5 Ethibond was used to reapproximate the gluteus medius tendon to its trochanteric insertion. Copious irrigation was performed once more. The tensor fascia jersey was then closed with an #0 Vicryl. Copious irrigation was performed once more. #0 and 2-0 Vicryl were used to close the subcutaneous layer. Staples were used for the skin. A sterile layered dressing was placed over the wound. The patient was awakened from anesthesia, transferred to a hospital bed, and taken to the PACU in stable condition. Disposition: The patient was taken to PACU in stable condition. Once stable, he will be transferred to the floor. Orders have been provided to begin physical therapy, weight bear as tolerated Right lower extremity. Patient received a dose of Ancef preoperatively. We will continue this for 24 hours postoperatively for infection prophylaxis. The patient will also be started on  Lovenox while in the hospital and transition to aspirin orally as an outpatient for DVT prophylaxis.       Electronically signed by Alberto Tuttle MD on 9/6/2020 at 7:03 PM Petra serviço é gratuito.  Monegasque Creole:  Nou genyen sèvis entèprèt gratis franky reponn tout kesyon ou ta genyen konsènan plan medikal oswa dwòg nou an.  Franky jwenn yon entèprèt, jis rele nou nan [phone]. Yon moun ki pale Kreyòl kapab sammie w.  Sa a se yon sèvis ki gratis.  Polish:  Umo¿liwiamy bezp³atne skorzystanie z us³ug t³umacza ustnego, który pomo¿e w uzyskaniu odpowiedzi na temat planu zdrowotnego lub dawheidinia romanw. Charity skorzystaæ z pomocy t³umacza znaj¹cego garo sorenson¿y zadzwoniæ pod numer [phone]. Ta us³uga jest bezp³atna.  Tajik: ????? ??????? ????????????????????? ??????????????????????????????????[phone] ???????????????? ? ????????????????? ?????

## (undated) DEVICE — TOWEL,OR,DSP,ST,BLUE,STD,6/PK,12PK/CS: Brand: MEDLINE

## (undated) DEVICE — SPONGE LAP W18XL18IN WHT COT 4 PLY FLD STRUNG RADPQ DISP ST

## (undated) DEVICE — INSTRUMENT SYSTEM 7 BATTERY REUSABLE

## (undated) DEVICE — 3M™ STERI-DRAPE™ U-DRAPE 1015: Brand: STERI-DRAPE™

## (undated) DEVICE — SUTURE ABSORBABLE MONOFILAMENT 1-0 OS8 14 IN STRATAFIX SPRL SXPD2B202

## (undated) DEVICE — COVER DSG W7 7 8XL11IN WHT POR CLTH PRECUT WTRPRF MEDIPORE

## (undated) DEVICE — 3M™ COBAN™ NL STERILE NON-LATEX SELF-ADHERENT WRAP, 2084S, 4 IN X 5 YD (10 CM X 4,5 M), 18 ROLLS/CASE: Brand: 3M™ COBAN™

## (undated) DEVICE — 3M™ IOBAN™ 2 ANTIMICROBIAL INCISE DRAPE 6650EZ: Brand: IOBAN™ 2

## (undated) DEVICE — SUTURE VCRL + SZ 3-0 L18IN ABSRB UD PS-1 L24MM 3/8 CIR PRIM VCP683G

## (undated) DEVICE — SUTURE ETHBND EXCEL SZ 5 L30IN NONABSORBABLE GRN L48MM CCS MB47G

## (undated) DEVICE — PILLOW POS W15XH6XL22IN RASPBERRY FOAM ABD W/ STRP DISP FOR

## (undated) DEVICE — DOUBLE BASIN SET: Brand: MEDLINE INDUSTRIES, INC.

## (undated) DEVICE — TUBING, SUCTION, 9/32" X 10', STRAIGHT: Brand: MEDLINE

## (undated) DEVICE — SET ORTHO STD STORTSTD1

## (undated) DEVICE — TRAY SYSTEM 7 DRILL REUSABLE

## (undated) DEVICE — POUCH STRL SELF-SEAL 5.25X10IN

## (undated) DEVICE — MARKER,SKIN,WI/RULER AND LABELS: Brand: MEDLINE

## (undated) DEVICE — INSTRUMENT CLAMP TOWEL LARGE REUSABLE

## (undated) DEVICE — Z DISCONTINUED PER MEDLINE USE 2741943 DRESSING AQUACEL 10 IN ALG W9XL25CM SIL CVR WTRPRF VIR BACT BARR ANTIMIC

## (undated) DEVICE — GOWN,SIRUS,FABRNF,2XL,18/CS: Brand: MEDLINE

## (undated) DEVICE — SOLUTION IV IRRIG WATER 1000ML POUR BRL 2F7114

## (undated) DEVICE — PACK PROCEDURE SURG GEN CUST

## (undated) DEVICE — TUBE IRRIG HNDPC HI FLO TP INTRPULS W/SUCTION TUBE

## (undated) DEVICE — TOTAL HIP PK

## (undated) DEVICE — RACK TUBE CURETTE

## (undated) DEVICE — EXTRAS HIP

## (undated) DEVICE — ELECTRODE PT RET AD L9FT HI MOIST COND ADH HYDRGEL CORDED

## (undated) DEVICE — Device

## (undated) DEVICE — APPLICATOR PREP 26ML 0.7% IOD POVACRYLEX 74% ISO ALC ST

## (undated) DEVICE — 4-PORT MANIFOLD: Brand: NEPTUNE 2

## (undated) DEVICE — PAD,ABDOMINAL,5"X9",ST,LF,25/BX: Brand: MEDLINE INDUSTRIES, INC.

## (undated) DEVICE — DRESSING,GAUZE,XEROFORM,CURAD,5"X9",ST: Brand: CURAD

## (undated) DEVICE — SET LAMBOTTE

## (undated) DEVICE — BLADE SAW SAG SYS 6 18X90X1.27MM

## (undated) DEVICE — GLOVE SURG 6.5 TRIFLEX SHORT WIDE FINGER

## (undated) DEVICE — STERILE LATEX POWDER-FREE SURGICAL GLOVESWITH NITRILE COATING: Brand: PROTEXIS

## (undated) DEVICE — COVER HNDL LT DISP

## (undated) DEVICE — 1000 S-DRAPE TOWEL DRAPE 10/BX: Brand: STERI-DRAPE™

## (undated) DEVICE — GLOVE ORANGE PI 8 1/2   MSG9085

## (undated) DEVICE — GLOVE ORANGE PI 7   MSG9070

## (undated) DEVICE — GAUZE,SPONGE,4"X4",8PLY,STRL,LF,10/TRAY: Brand: MEDLINE